# Patient Record
Sex: FEMALE | Race: WHITE | NOT HISPANIC OR LATINO | Employment: OTHER | RURAL
[De-identification: names, ages, dates, MRNs, and addresses within clinical notes are randomized per-mention and may not be internally consistent; named-entity substitution may affect disease eponyms.]

---

## 2023-04-26 ENCOUNTER — OFFICE VISIT (OUTPATIENT)
Dept: FAMILY MEDICINE CLINIC | Facility: CLINIC | Age: 68
End: 2023-04-26
Payer: MEDICARE

## 2023-04-26 VITALS
BODY MASS INDEX: 25.39 KG/M2 | HEART RATE: 99 BPM | WEIGHT: 148.7 LBS | DIASTOLIC BLOOD PRESSURE: 86 MMHG | SYSTOLIC BLOOD PRESSURE: 208 MMHG | HEIGHT: 64 IN | OXYGEN SATURATION: 95 %

## 2023-04-26 DIAGNOSIS — Z12.11 SCREENING FOR COLON CANCER: ICD-10-CM

## 2023-04-26 DIAGNOSIS — F51.01 PRIMARY INSOMNIA: ICD-10-CM

## 2023-04-26 DIAGNOSIS — B02.9 HERPES ZOSTER WITHOUT COMPLICATION: ICD-10-CM

## 2023-04-26 DIAGNOSIS — Z13.820 SCREENING FOR OSTEOPOROSIS: ICD-10-CM

## 2023-04-26 DIAGNOSIS — I10 ESSENTIAL HYPERTENSION: Primary | ICD-10-CM

## 2023-04-26 DIAGNOSIS — Z78.0 POSTMENOPAUSAL: ICD-10-CM

## 2023-04-26 DIAGNOSIS — Z12.31 ENCOUNTER FOR SCREENING MAMMOGRAM FOR MALIGNANT NEOPLASM OF BREAST: ICD-10-CM

## 2023-04-26 DIAGNOSIS — Z87.891 PERSONAL HISTORY OF TOBACCO USE: ICD-10-CM

## 2023-04-26 PROCEDURE — 3079F DIAST BP 80-89 MM HG: CPT | Performed by: INTERNAL MEDICINE

## 2023-04-26 PROCEDURE — 99204 OFFICE O/P NEW MOD 45 MIN: CPT | Performed by: INTERNAL MEDICINE

## 2023-04-26 PROCEDURE — 3077F SYST BP >= 140 MM HG: CPT | Performed by: INTERNAL MEDICINE

## 2023-04-26 PROCEDURE — 1160F RVW MEDS BY RX/DR IN RCRD: CPT | Performed by: INTERNAL MEDICINE

## 2023-04-26 PROCEDURE — 1159F MED LIST DOCD IN RCRD: CPT | Performed by: INTERNAL MEDICINE

## 2023-04-26 RX ORDER — TRAZODONE HYDROCHLORIDE 150 MG/1
150 TABLET ORAL NIGHTLY
COMMUNITY
End: 2023-04-26 | Stop reason: SDUPTHER

## 2023-04-26 RX ORDER — GABAPENTIN 800 MG/1
800 TABLET ORAL 4 TIMES DAILY
COMMUNITY

## 2023-04-26 RX ORDER — HYDROCODONE BITARTRATE AND ACETAMINOPHEN 10; 325 MG/1; MG/1
1 TABLET ORAL EVERY 6 HOURS PRN
COMMUNITY

## 2023-04-26 RX ORDER — CHOLECALCIFEROL (VITAMIN D3) 125 MCG
5 CAPSULE ORAL
COMMUNITY

## 2023-04-26 RX ORDER — LISINOPRIL 10 MG/1
10 TABLET ORAL DAILY
Qty: 90 TABLET | Refills: 1 | Status: SHIPPED | OUTPATIENT
Start: 2023-04-26

## 2023-04-26 RX ORDER — METOPROLOL TARTRATE 50 MG/1
50 TABLET, FILM COATED ORAL 2 TIMES DAILY
Qty: 180 TABLET | Refills: 1 | Status: SHIPPED | OUTPATIENT
Start: 2023-04-26

## 2023-04-26 RX ORDER — TRAZODONE HYDROCHLORIDE 150 MG/1
150 TABLET ORAL NIGHTLY
Qty: 90 TABLET | Refills: 1 | Status: SHIPPED | OUTPATIENT
Start: 2023-04-26

## 2023-04-26 RX ORDER — LISINOPRIL 10 MG/1
10 TABLET ORAL DAILY
COMMUNITY
End: 2023-04-26 | Stop reason: SDUPTHER

## 2023-04-26 RX ORDER — VALACYCLOVIR HYDROCHLORIDE 1 G/1
1000 TABLET, FILM COATED ORAL 2 TIMES DAILY
Qty: 14 TABLET | Refills: 0 | Status: SHIPPED | OUTPATIENT
Start: 2023-04-26 | End: 2023-05-03

## 2023-04-26 RX ORDER — METOPROLOL TARTRATE 50 MG/1
50 TABLET, FILM COATED ORAL 2 TIMES DAILY
COMMUNITY
End: 2023-04-26 | Stop reason: SDUPTHER

## 2023-04-26 NOTE — PROGRESS NOTES
Office Note     Name: Chika Pike    : 1955     MRN: [id number]     Chief Complaint  Hand Pain (C/o left pointer finger pain. ), Back Pain (Pt complains of back pain that goes into her neck. ), and Establish Care (Pt would like to establish care today. )    Subjective     History of Present Illness:  Chika Pike is a 68 y.o. female who presents today for establishing care.        Previous Doctor: Caroline  Other Doctors: Dr [facility]; has branden seeing them for hte last 7 years due to DDD, thinks she had complications from a spinal fusion    Last colonoscopy: Age 52   Location:[facility]  Last Mammo: Last Year   Location: [facility]- has had 2 biopsies in the past.   Last PAP: Does not recall   Location: prior PCP  Last Dexa: Years ago    Location:  [place]  Last LDCT: None    Location:NONE  Last fasting labs: < 1 year   Location Prior PCP    HTN:  Has been on meds about 10 years, recently started lisinopril but has been out for about 1 week     recently passed away this past November, has been struggling with grief. He did have hospice services.  Has been much more down and depressed,  Has new onset panic attacks , gets shortness of breath  Episodes happening 4-5 tiems a per week.  Currently lives alone, daughter and son are in [place].    Has made herself more secluded     Has been on multiple antidepresseants in the distant past and nothing work.       Review of Systems:   Review of Systems    Past Medical History:   Past Medical History:   Diagnosis Date   • Ankle fracture     Right   • H/O spinal fusion    • Sinus symptom     Sinus Surgery       Past Surgical History:   Past Surgical History:   Procedure Laterality Date   • BREAST BIOPSY      Dr Keith Velazquez   • COLONOSCOPY     • SINUS SURGERY     • SPINAL FUSION         Family History:   Family History   Problem Relation Age of Onset   • Diabetes Brother    • Breast cancer Maternal Aunt   "      Social History:   Social History     Socioeconomic History   • Marital status:    Tobacco Use   • Smoking status: Every Day     Packs/day: 1.00     Years: 36.00     Pack years: 36.00     Types: Cigarettes     Start date: 1987   • Smokeless tobacco: Never   Vaping Use   • Vaping Use: Never used   Substance and Sexual Activity   • Alcohol use: Not Currently   • Drug use: Defer       Immunizations:   There is no immunization history on file for this patient.     Medications:     Current Outpatient Medications:   •  CBD (cannabidiol) oral oil, Take  by mouth., Disp: , Rfl:   •  ELDERBERRY PO, Take  by mouth., Disp: , Rfl:   •  gabapentin (NEURONTIN) 800 MG tablet, Take 1 tablet by mouth 4 (Four) Times a Day., Disp: , Rfl:   •  HYDROcodone-acetaminophen (NORCO)  MG per tablet, Take 1 tablet by mouth Every 6 (Six) Hours As Needed for Moderate Pain., Disp: , Rfl:   •  lisinopril (PRINIVIL,ZESTRIL) 10 MG tablet, Take 1 tablet by mouth Daily., Disp: , Rfl:   •  melatonin 5 MG tablet tablet, Take 1 tablet by mouth., Disp: , Rfl:   •  metoprolol tartrate (LOPRESSOR) 50 MG tablet, Take 1 tablet by mouth 2 (Two) Times a Day., Disp: , Rfl:   •  traZODone (DESYREL) 150 MG tablet, Take 1 tablet by mouth Every Night., Disp: , Rfl:     Allergies:   Allergies   Allergen Reactions   • Codeine Unknown - High Severity   • Morphine Unknown - High Severity       Objective     Vital Signs  BP (!) 208/86   Pulse 99   Ht 162.6 cm (64\")   Wt 67.4 kg (148 lb 11.2 oz)   SpO2 95%   BMI 25.52 kg/m²   Estimated body mass index is 25.52 kg/m² as calculated from the following:    Height as of this encounter: 162.6 cm (64\").    Weight as of this encounter: 67.4 kg (148 lb 11.2 oz).          Physical Exam  Vitals and nursing note reviewed.   Constitutional:       Appearance: Normal appearance.   Cardiovascular:      Rate and Rhythm: Normal rate and regular rhythm.      Heart sounds: No murmur heard.    No friction rub. No " gallop.   Pulmonary:      Effort: Pulmonary effort is normal.      Breath sounds: Normal breath sounds. No wheezing, rhonchi or rales.   Skin:     Findings: Lesion present. Bruising: right gluteal region, spares midlin,e cluster of papulovesicular lesions.   Neurological:      Mental Status: She is alert.         Procedures     Assessment and Plan     1. Essential hypertension  - lisinopril (PRINIVIL,ZESTRIL) 10 MG tablet; Take 1 tablet by mouth Daily.  Dispense: 90 tablet; Refill: 1  - metoprolol tartrate (LOPRESSOR) 50 MG tablet; Take 1 tablet by mouth 2 (Two) Times a Day.  Dispense: 180 tablet; Refill: 1    2. Primary insomnia  - traZODone (DESYREL) 150 MG tablet; Take 1 tablet by mouth Every Night.  Dispense: 90 tablet; Refill: 1    3. Encounter for screening mammogram for malignant neoplasm of breast  - Mammo Screening Bilateral With CAD; Future    4. Personal history of tobacco use  -  CT Chest Low Dose Cancer Screening WO; Future    5. Screening for osteoporosis  - DEXA Bone Density Axial; Future    6. Screening for colon cancer  - Ambulatory Referral For Screening Colonoscopy    7. Herpes zoster without complication  - valACYclovir (Valtrex) 1000 MG tablet; Take 1 tablet by mouth 2 (Two) Times a Day for 7 days.  Dispense: 14 tablet; Refill: 0    8. Postmenopausal  - DEXA Bone Density Axial; Future       Follow Up  Return in about 6 weeks (around 6/7/2023) for Followup with Dr Trevizo- IN PERSON.    Patient was given instructions and counseling regarding her condition or for health maintenance advice. Please see specific information pulled into the AVS if appropriate.     Carole Trevizo MD  [facility]  [address] [place] [id number] [phone]

## 2023-05-12 ENCOUNTER — TELEPHONE (OUTPATIENT)
Dept: FAMILY MEDICINE CLINIC | Facility: CLINIC | Age: 68
End: 2023-05-12
Payer: MEDICARE

## 2023-05-12 DIAGNOSIS — Z12.11 SCREENING FOR COLON CANCER: ICD-10-CM

## 2023-05-12 DIAGNOSIS — F33.1 MAJOR DEPRESSIVE DISORDER, RECURRENT EPISODE, MODERATE DEGREE: Primary | ICD-10-CM

## 2023-05-12 NOTE — TELEPHONE ENCOUNTER
"    Caller: Chika Pike \"Laura\"    Relationship: Self    Best call back number: [phone]    What medications are you currently taking:   Current Outpatient Medications on File Prior to Visit   Medication Sig Dispense Refill   • CBD (cannabidiol) oral oil Take  by mouth.     • ELDERBERRY PO Take  by mouth.     • gabapentin (NEURONTIN) 800 MG tablet Take 1 tablet by mouth 4 (Four) Times a Day.     • HYDROcodone-acetaminophen (NORCO)  MG per tablet Take 1 tablet by mouth Every 6 (Six) Hours As Needed for Moderate Pain.     • lisinopril (PRINIVIL,ZESTRIL) 10 MG tablet Take 1 tablet by mouth Daily. 90 tablet 1   • melatonin 5 MG tablet tablet Take 1 tablet by mouth.     • metoprolol tartrate (LOPRESSOR) 50 MG tablet Take 1 tablet by mouth 2 (Two) Times a Day. 180 tablet 1   • traZODone (DESYREL) 150 MG tablet Take 1 tablet by mouth Every Night. 90 tablet 1     No current facility-administered medications on file prior to visit.       What are your concerns: PT STATED THAT SHE HAS NOT RECEIVED HER DEPRESSION MEDICATION, STATED THAT IT HAS NOT BEE S[facility] TO [facility]ARMACY: [facility] [address] [facility] - [phone]  - [phone] FX      "

## 2023-05-12 NOTE — TELEPHONE ENCOUNTER
"  Caller: Chika Pike \"Laura\"     Relationship: [unfilled]     Best call back number: [phone]    What is your medical concern: PT STATED THAT COLONOSCOPY HAS BEEN ORDER FOR HER BUT SHE WOULD RATHER FOR THE COLOGAURD.  "

## 2023-05-17 RX ORDER — SERTRALINE HYDROCHLORIDE 25 MG/1
25 TABLET, FILM COATED ORAL DAILY
Qty: 90 TABLET | Refills: 1 | Status: SHIPPED | OUTPATIENT
Start: 2023-05-17

## 2023-05-17 NOTE — TELEPHONE ENCOUNTER
New rx Sent for zoloft. This will hopefully help with both anxiety and depression.  Her other meds were already sent to the pharmacy. She can take this at bedtime with trazodone. I know she took depression meds in the past but hopefulli since it's been awhile and her situation has changes she may have better luck with this now.  I have also ordered cologard for her.  Keep in mind cologard should not be used if she has had polyps in the past or if there is a family member with colon cancer. If the cologard is abnormal she willl still need to get the colonoscopy at a later date.

## 2023-05-30 DIAGNOSIS — N28.9 KIDNEY LESION: Primary | ICD-10-CM

## 2023-06-05 ENCOUNTER — TELEPHONE (OUTPATIENT)
Dept: FAMILY MEDICINE CLINIC | Facility: CLINIC | Age: 68
End: 2023-06-05
Payer: MEDICARE

## 2023-06-05 NOTE — TELEPHONE ENCOUNTER
"Caller: Chiak Pike \"Laura\"    Relationship: Self    Best call back number:     [phone]       Requested Prescriptions:   Requested Prescriptions      No prescriptions requested or ordered in this encounter    [facility]    Pharmacy where request should be sent: [place] [address] [facility] [address] - [phone] PH - [phone] FX     Last office visit with prescribing clinician: 4/26/2023   Last telemedicine visit with prescribing clinician: Visit date not found   Next office visit with prescribing clinician: 7/25/2023     Additional details provided by patient: PATIENT IS OUT OF THE MEDICATION AND SICK TO HER STOMACH     Does the patient have less than a 3 day supply:  [x] Yes  [] No    Would you like a call back once the refill request has been completed: [] Yes [x] No    If the office needs to give you a call back, can they leave a voicemail: [] Yes [x] No      "

## 2023-06-06 NOTE — TELEPHONE ENCOUNTER
Not on med list, called pt, said old PCP prescribed it last but she sees Dr. Trevizo now. Has appt scheduled 07/25/23. Told her I would ask if Dr. Trevizo could go ahead and refill

## 2023-06-09 NOTE — TELEPHONE ENCOUNTER
She was supposed to followup this week regarding her very poorly controlled blood pressure but it looks like she cancelled/rescheduled it.  We can discuss it when she comes back for her followup but I do not feel comfortable prescribing this medication since it was not discussed.

## 2023-06-13 NOTE — TELEPHONE ENCOUNTER
Called and gave the message, she says she is feeling well now. She was having a lot of double vision and feeling funny, she went to the eye doctor and they said she has bad cataracts.

## 2023-07-18 PROBLEM — J44.9 COPD (CHRONIC OBSTRUCTIVE PULMONARY DISEASE): Status: ACTIVE | Noted: 2023-07-18

## 2023-08-01 ENCOUNTER — TELEPHONE (OUTPATIENT)
Dept: FAMILY MEDICINE CLINIC | Facility: CLINIC | Age: 68
End: 2023-08-01
Payer: MEDICARE

## 2023-08-01 NOTE — TELEPHONE ENCOUNTER
PT CALLED TO REPORT SHE DID NOT KNOW SHE NEEDED REFERRAL FOR LUNG DOCTOR. SHE IS REQUESTING A REFERRAL BE SENT IN TO PULMONOLOGY IN [place]. PLEASE CALL TO ADVISE.

## 2023-08-16 NOTE — TELEPHONE ENCOUNTER
To do a referral I need  the reasons for medical necessity and diagnosis codes  documented in the chart more specifically, lets have her keep her appointment later this month and I would be happy to place the referral at that time with more detailed information and do any other testing that is needed as well.

## 2023-08-30 ENCOUNTER — OFFICE VISIT (OUTPATIENT)
Dept: FAMILY MEDICINE CLINIC | Facility: CLINIC | Age: 68
End: 2023-08-30
Payer: OTHER GOVERNMENT

## 2023-08-30 VITALS
SYSTOLIC BLOOD PRESSURE: 164 MMHG | DIASTOLIC BLOOD PRESSURE: 82 MMHG | HEART RATE: 78 BPM | BODY MASS INDEX: 21.5 KG/M2 | HEIGHT: 67 IN | OXYGEN SATURATION: 98 % | WEIGHT: 137 LBS

## 2023-08-30 DIAGNOSIS — I10 ESSENTIAL HYPERTENSION: ICD-10-CM

## 2023-08-30 DIAGNOSIS — J44.9 CHRONIC OBSTRUCTIVE PULMONARY DISEASE, UNSPECIFIED COPD TYPE: Primary | ICD-10-CM

## 2023-08-30 DIAGNOSIS — F33.1 MAJOR DEPRESSIVE DISORDER, RECURRENT EPISODE, MODERATE DEGREE: ICD-10-CM

## 2023-08-30 PROCEDURE — 99213 OFFICE O/P EST LOW 20 MIN: CPT | Performed by: INTERNAL MEDICINE

## 2023-08-30 RX ORDER — FLUTICASONE FUROATE AND VILANTEROL 100; 25 UG/1; UG/1
1 POWDER RESPIRATORY (INHALATION)
Qty: 3 EACH | Refills: 1 | Status: SHIPPED | OUTPATIENT
Start: 2023-08-30

## 2023-08-30 RX ORDER — LISINOPRIL 20 MG/1
20 TABLET ORAL DAILY
Qty: 90 TABLET | Refills: 1 | Status: SHIPPED | OUTPATIENT
Start: 2023-08-30 | End: 2023-09-12 | Stop reason: SDUPTHER

## 2023-08-30 NOTE — PROGRESS NOTES
Office Note     Name: Chika Pike    : 1955     MRN: [id number]     Chief Complaint  Hypertension (Pt is here for a medication recheck today on HTN. )    Answers submitted by the patient for this visit:  Primary Reason for Visit (Submitted on 2023)  What is the primary reason for your visit?: High Blood Pressure    Subjective         History of Present Illness:    Chika Pike is a 68 y.o. female [facility]      PUL Referral has history of COPD, takes medications but would like to follow [facility]    Hypertension: Patient is here to followup on hypertension and is  taking all medications. Patient has not  experienced signicant side effects.  Is currently asymptomatic  in terms of chest pain, palpitations, shortness of breath.     Post office has not been able to  or take her sampel because the label wouldn't scan properly      Goes back to Cardiology in [place] in October.    COPD;  has been on Breo but has run out of her medication      Review of Systems:   Review of Systems    Past Medical History:   Past Medical History:   Diagnosis Date    Ankle fracture     Right    H/O spinal fusion     Sinus symptom     Sinus Surgery       Past Surgical History:   Past Surgical History:   Procedure Laterality Date    BREAST BIOPSY      Dr Keith Geller [place]    COLONOSCOPY      SINUS SURGERY      SPINAL FUSION         Family History:   Family History   Problem Relation Age of Onset    Diabetes Brother     Breast cancer Maternal Aunt        Social History:   Social History     Socioeconomic History    Marital status:    Tobacco Use    Smoking status: Every Day     Packs/day: 1.00     Years: 36.00     Pack years: 36.00     Types: Cigarettes     Start date:     Smokeless tobacco: Never   Vaping Use    Vaping Use: Never used   Substance and Sexual Activity    Alcohol use: Not Currently    Drug use: Defer       Immunizations:   Immunization History   Administered  "Date(s) Administered    COVID-19 (PFIZER) Purple Cap Monovalent 08/30/2021, 09/22/2021    Hepatitis A 02/19/2019, 08/26/2019        Medications:     Current Outpatient Medications:     amitriptyline (ELAVIL) 25 MG tablet, Take 1 tablet by mouth Every Night., Disp: 30 tablet, Rfl: 3    CBD (cannabidiol) oral oil, Take  by mouth., Disp: , Rfl:     ELDERBERRY PO, Take  by mouth., Disp: , Rfl:     lisinopril (PRINIVIL,ZESTRIL) 10 MG tablet, Take 1 tablet by mouth Daily., Disp: 90 tablet, Rfl: 0    melatonin 5 MG tablet tablet, Take 1 tablet by mouth., Disp: , Rfl:     metoprolol tartrate (LOPRESSOR) 50 MG tablet, Take 1 tablet by mouth 2 (Two) Times a Day., Disp: 180 tablet, Rfl: 1    sertraline (Zoloft) 50 MG tablet, Take 0.5 tablets by mouth Daily., Disp: 30 tablet, Rfl: 1    traZODone (DESYREL) 150 MG tablet, Take 1 tablet by mouth Every Night., Disp: 90 tablet, Rfl: 1    Allergies:   Allergies   Allergen Reactions    Codeine Rash    Morphine Rash       Objective     Vital Signs  /82   Pulse 78   Ht 170.2 cm (67\")   Wt 62.1 kg (137 lb)   SpO2 98%   BMI 21.46 kg/m²   Estimated body mass index is 21.46 kg/m² as calculated from the following:    Height as of this encounter: 170.2 cm (67\").    Weight as of this encounter: 62.1 kg (137 lb).    BMI is within normal parameters. No other follow-up for BMI required.      Physical Exam  Vitals and nursing note reviewed.   Constitutional:       Appearance: Normal appearance.   Cardiovascular:      Rate and Rhythm: Normal rate and regular rhythm.      Heart sounds: No murmur heard.    No friction rub. No gallop.   Pulmonary:      Effort: Pulmonary effort is normal.      Breath sounds: Normal breath sounds. No wheezing, rhonchi or rales.   Neurological:      Mental Status: She is alert.          Procedures     Assessment and Plan      1. Chronic obstructive pulmonary disease, unspecified COPD type    - Fluticasone Furoate-Vilanterol 100-25 MCG/ACT aerosol powder ; " Inhale 1 puff Daily.  Dispense: 3 each; Refill: 1  - Ambulatory Referral to Pulmonology    2. Essential hypertension  - continue lisinipril  - metoprolol tartrate (LOPRESSOR) 50 MG tablet; Take 1 tablet by mouth 2 (Two) Times a Day.  Dispense: 180 tablet; Refill: 1    3. Major depressive disorder, recurrent episode, moderate degree    - sertraline (Zoloft) 50 MG tablet; Take 0.5 tablets by mouth Daily.  Dispense: 30 tablet; Refill: 1        Follow Up  Return in about 4 months (around 12/30/2023) for Followup with Dr Trevizo- IN PERSON.    Patient was advised to call the office or seek medical care if  any issues discussed during this visit worsen or persist or if new concerns arise        [facility]  [address] [place] [id number] [phone]

## 2023-09-12 DIAGNOSIS — I10 ESSENTIAL HYPERTENSION: ICD-10-CM

## 2023-09-12 NOTE — TELEPHONE ENCOUNTER
"  Caller: Chika Pike \"Laura\"    Relationship: Self    Best call back number: [phone]     Requested Prescriptions:   Requested Prescriptions     Pending Prescriptions Disp Refills    lisinopril (PRINIVIL,ZESTRIL) 20 MG tablet 90 tablet 1     Sig: Take 1 tablet by mouth Daily.        Pharmacy where request should be sent: [place] [address] - [phone] PH - [phone] FX     Last office visit with prescribing clinician: 8/30/2023   Last telemedicine visit with prescribing clinician: Visit date not found   Next office visit with prescribing clinician: 12/26/2023     Additional details provided by patient: PATIENT CALLED STATING THAT SHE IS OUT OF MEDICATION.  PATIENT STATED THAT SHE IS WAITING FOR HER MEDICATION TO BE SENT FROM HER MAIL PHARMACY.  SHE STATED IT WILL BE ANOTHER  5 DAYS.     Does the patient have less than a 3 day supply:  [x] Yes  [] No    Would you like a call back once the refill request has been completed: [x] Yes [] No    If the office needs to give you a call back, can they leave a voicemail: [x] Yes [] No    Maverick Mckenzie Rep   09/12/23 13:36 EDT     "

## 2023-09-13 RX ORDER — LISINOPRIL 20 MG/1
20 TABLET ORAL DAILY
Qty: 30 TABLET | Refills: 0 | Status: SHIPPED | OUTPATIENT
Start: 2023-09-13

## 2023-09-29 ENCOUNTER — TELEPHONE (OUTPATIENT)
Dept: FAMILY MEDICINE CLINIC | Facility: CLINIC | Age: 68
End: 2023-09-29
Payer: MEDICARE

## 2023-09-29 RX ORDER — METOPROLOL TARTRATE 50 MG/1
50 TABLET, FILM COATED ORAL 2 TIMES DAILY
Qty: 180 TABLET | Refills: 1 | Status: SHIPPED | OUTPATIENT
Start: 2023-09-29

## 2023-09-29 NOTE — TELEPHONE ENCOUNTER
"   Caller: Chika Pike \"Laura\"     Relationship: Self     Best call back number: [phone]      What medication are you requesting: LINZESS 72MG  AND NEEDS IT FOR 3 MONTH SUPPLY      If a prescription is needed, what is your preferred pharmacy and phone number:  [facility] [place] [id number] [facility] - [phone] [facility] [phone]       Additional notes:  GIVEN [facility] BUT SHE NO LONGER GOES THERE     "

## 2023-09-29 NOTE — TELEPHONE ENCOUNTER
"  Caller: Chika Pike \"Laura\"    Relationship: Self    Best call back number: [phone]     What medication are you requesting: LINZESS     If a prescription is needed, what is your preferred pharmacy and phone number:  [facility] [place] [address] [facility] - [phone] [facility] [phone]      Additional notes:  GIVEN TO [facility] BUT SHE NO LONGER GOES THERE.    "

## 2023-10-02 ENCOUNTER — TELEPHONE (OUTPATIENT)
Dept: FAMILY MEDICINE CLINIC | Facility: CLINIC | Age: 68
End: 2023-10-02
Payer: MEDICARE

## 2023-10-02 NOTE — TELEPHONE ENCOUNTER
Incoming Refill Request      Medication requested (name and dose):   LINZESS - PT DID NOT KNOW DOSAGE  TRAZODONE 150 MG  TIZANIDINE - PT DID NOT KNOW DOSAGE      Pharmacy where request should be sent:   [facility]    Additional details provided by patient:   PATIENT REQUESTS A 90 DAY SUPPLY    Best call back number: [phone]     Does the patient have less than a 3 day supply:  [x] Yes  [] No    Maverick Ac Rep  10/02/23, 17:06 EDT

## 2023-10-04 DIAGNOSIS — F51.01 PRIMARY INSOMNIA: ICD-10-CM

## 2023-10-05 RX ORDER — TRAZODONE HYDROCHLORIDE 150 MG/1
150 TABLET ORAL NIGHTLY
Qty: 90 TABLET | Refills: 0 | Status: SHIPPED | OUTPATIENT
Start: 2023-10-05

## 2023-10-05 NOTE — TELEPHONE ENCOUNTER
Pt contacted the other two have been sent but I need the zanaflex dose and how she takes told pt  to call back up here with that info.

## 2023-10-05 NOTE — TELEPHONE ENCOUNTER
Per note on 7/18, Dr Trevizo is using Elavil to help with neuopathic symptoms since patient was taken off of gabapentin. It was discussed at that visit about risks involved. Will send in 1 fill, further refills to come from Dr. Trevizo. Thanks.

## 2023-10-16 ENCOUNTER — TELEPHONE (OUTPATIENT)
Dept: FAMILY MEDICINE CLINIC | Facility: CLINIC | Age: 68
End: 2023-10-16

## 2023-10-16 DIAGNOSIS — I10 ESSENTIAL HYPERTENSION: ICD-10-CM

## 2023-10-16 RX ORDER — LISINOPRIL 20 MG/1
20 TABLET ORAL DAILY
Qty: 90 TABLET | Refills: 0 | Status: SHIPPED | OUTPATIENT
Start: 2023-10-16 | End: 2023-12-26 | Stop reason: SDUPTHER

## 2023-10-18 ENCOUNTER — TELEPHONE (OUTPATIENT)
Dept: FAMILY MEDICINE CLINIC | Facility: CLINIC | Age: 68
End: 2023-10-18
Payer: MEDICARE

## 2023-10-18 NOTE — TELEPHONE ENCOUNTER
"Caller: Chika Pike \"Laura\"    Relationship: Self    Best call back number: [phone]    What medication are you requesting:     PHENEGRAN OR ZOFRAN    What are your current symptoms:     VOMITING    If a prescription is needed, what is your preferred pharmacy and phone number:      [facility] - [place] [address] [address] - [phone]  - [phone] FX      Additional notes:    PEPTO IS NOT HELPING    PLEASE CALL PATIENT WHEN THIS HAS BEEN CALLED IN   "

## 2023-10-24 RX ORDER — ONDANSETRON HYDROCHLORIDE 8 MG/1
8 TABLET, FILM COATED ORAL EVERY 8 HOURS PRN
Qty: 12 TABLET | Refills: 1 | Status: SHIPPED | OUTPATIENT
Start: 2023-10-24

## 2023-10-25 ENCOUNTER — TELEPHONE (OUTPATIENT)
Dept: FAMILY MEDICINE CLINIC | Facility: CLINIC | Age: 68
End: 2023-10-25

## 2023-10-25 DIAGNOSIS — F51.01 PRIMARY INSOMNIA: ICD-10-CM

## 2023-10-25 RX ORDER — AMITRIPTYLINE HYDROCHLORIDE 25 MG/1
25 TABLET, FILM COATED ORAL NIGHTLY
Qty: 90 TABLET | Refills: 0 | Status: SHIPPED | OUTPATIENT
Start: 2023-10-25 | End: 2023-12-26 | Stop reason: SDUPTHER

## 2023-12-13 ENCOUNTER — TELEPHONE (OUTPATIENT)
Dept: FAMILY MEDICINE CLINIC | Facility: CLINIC | Age: 68
End: 2023-12-13

## 2023-12-13 DIAGNOSIS — F51.01 PRIMARY INSOMNIA: Primary | ICD-10-CM

## 2023-12-13 DIAGNOSIS — F33.1 MAJOR DEPRESSIVE DISORDER, RECURRENT EPISODE, MODERATE DEGREE: ICD-10-CM

## 2023-12-13 NOTE — TELEPHONE ENCOUNTER
"Caller: Chika Pike \"Laura\"    Relationship: Self    Best call back number: [phone]     What is the medical concern/diagnosis: DEPRESSION AND ANXIETY, INSOMNIA     What specialty or service is being requested: PSYCHIATRIST     What is the provider, practice or medical service name: DR DAISY ERIC    What is the office location: [address] [place] [address]    What is the office phone number: [phone]    Any additional details: PATIENT CALLED REQUESTING A REFERRAL. PLEASE ADVISE IF ONE IS REQUIRED         "

## 2023-12-26 ENCOUNTER — OFFICE VISIT (OUTPATIENT)
Dept: FAMILY MEDICINE CLINIC | Facility: CLINIC | Age: 68
End: 2023-12-26
Payer: MEDICARE

## 2023-12-26 VITALS
SYSTOLIC BLOOD PRESSURE: 148 MMHG | WEIGHT: 146.9 LBS | HEIGHT: 66 IN | OXYGEN SATURATION: 97 % | HEART RATE: 83 BPM | BODY MASS INDEX: 23.61 KG/M2 | DIASTOLIC BLOOD PRESSURE: 72 MMHG

## 2023-12-26 DIAGNOSIS — J44.9 CHRONIC OBSTRUCTIVE PULMONARY DISEASE, UNSPECIFIED COPD TYPE: ICD-10-CM

## 2023-12-26 DIAGNOSIS — F33.1 MAJOR DEPRESSIVE DISORDER, RECURRENT EPISODE, MODERATE DEGREE: ICD-10-CM

## 2023-12-26 DIAGNOSIS — G89.29 CHRONIC LEFT-SIDED LOW BACK PAIN WITH LEFT-SIDED SCIATICA: ICD-10-CM

## 2023-12-26 DIAGNOSIS — F51.01 PRIMARY INSOMNIA: Primary | ICD-10-CM

## 2023-12-26 DIAGNOSIS — M51.37 DDD (DEGENERATIVE DISC DISEASE), LUMBOSACRAL: ICD-10-CM

## 2023-12-26 DIAGNOSIS — I10 ESSENTIAL HYPERTENSION: ICD-10-CM

## 2023-12-26 DIAGNOSIS — M79.2 NEUROPATHIC PAIN: ICD-10-CM

## 2023-12-26 DIAGNOSIS — M54.42 CHRONIC LEFT-SIDED LOW BACK PAIN WITH LEFT-SIDED SCIATICA: ICD-10-CM

## 2023-12-26 PROCEDURE — 3077F SYST BP >= 140 MM HG: CPT | Performed by: INTERNAL MEDICINE

## 2023-12-26 PROCEDURE — 1159F MED LIST DOCD IN RCRD: CPT | Performed by: INTERNAL MEDICINE

## 2023-12-26 PROCEDURE — 1160F RVW MEDS BY RX/DR IN RCRD: CPT | Performed by: INTERNAL MEDICINE

## 2023-12-26 PROCEDURE — 3078F DIAST BP <80 MM HG: CPT | Performed by: INTERNAL MEDICINE

## 2023-12-26 PROCEDURE — 99213 OFFICE O/P EST LOW 20 MIN: CPT | Performed by: INTERNAL MEDICINE

## 2023-12-26 RX ORDER — FLUTICASONE FUROATE AND VILANTEROL 100; 25 UG/1; UG/1
1 POWDER RESPIRATORY (INHALATION)
Qty: 3 EACH | Refills: 1 | Status: SHIPPED | OUTPATIENT
Start: 2023-12-26

## 2023-12-26 RX ORDER — LISINOPRIL 20 MG/1
20 TABLET ORAL DAILY
Qty: 90 TABLET | Refills: 1 | Status: SHIPPED | OUTPATIENT
Start: 2023-12-26

## 2023-12-26 RX ORDER — GABAPENTIN 300 MG/1
300 CAPSULE ORAL 3 TIMES DAILY
COMMUNITY

## 2023-12-26 RX ORDER — HYDROCODONE BITARTRATE AND ACETAMINOPHEN 7.5; 325 MG/1; MG/1
1 TABLET ORAL 3 TIMES DAILY
COMMUNITY

## 2023-12-26 RX ORDER — METOPROLOL TARTRATE 50 MG/1
50 TABLET, FILM COATED ORAL 2 TIMES DAILY
Qty: 180 TABLET | Refills: 1 | Status: SHIPPED | OUTPATIENT
Start: 2023-12-26

## 2023-12-26 RX ORDER — AMITRIPTYLINE HYDROCHLORIDE 25 MG/1
25 TABLET, FILM COATED ORAL NIGHTLY
Qty: 90 TABLET | Refills: 1 | Status: SHIPPED | OUTPATIENT
Start: 2023-12-26

## 2023-12-26 RX ORDER — CYCLOBENZAPRINE HCL 10 MG
10 TABLET ORAL DAILY PRN
Qty: 10 TABLET | Refills: 0 | Status: SHIPPED | OUTPATIENT
Start: 2023-12-26

## 2023-12-26 RX ORDER — ALBUTEROL SULFATE 90 UG/1
2 AEROSOL, METERED RESPIRATORY (INHALATION)
COMMUNITY

## 2023-12-26 RX ORDER — TRAZODONE HYDROCHLORIDE 150 MG/1
150 TABLET ORAL NIGHTLY
Qty: 90 TABLET | Refills: 1 | Status: SHIPPED | OUTPATIENT
Start: 2023-12-26

## 2023-12-26 NOTE — PROGRESS NOTES
Office Note     Name: Chika Pike    : 1955     MRN: [id number]     Chief Complaint  Back Pain (Pt is here for a recheck on back pain today. )  Hypertension,  COPD    Answers submitted by the patient for this visit:  Primary Reason for Visit (Submitted on 2023)  What is the primary reason for your visit?: Back Pain  Back Pain Questionnaire (Submitted on 2023)  Chief Complaint: Back pain  Chronicity: chronic  Onset: more than 1 year ago  Frequency: constantly  Progression since onset: gradually worsening  Pain location: lumbar spine  Pain quality: aching  Radiates to: right foot, right knee, right thigh  Pain - numeric: 9/10  Pain is: the same all the time  Aggravated by: bending, coughing, position, lying down, sitting, standing, stress, twisting  Stiffness is present: all day  bowel incontinence: No  headaches: No  leg pain: Yes  paresis: No  paresthesias: Yes  perianal numbness: No  tingling: Yes    Subjective     History of Present Illness:  Chika Pike is a 68 y.o. female who presents today for:    Hypertension: Patient is here to followup on hypertension and is  taking all medications. Patient has not  experienced signicant side effects.  Is currently asymptomatic  in terms of chest pain, palpitations, shortness of breath.     Depression/Anxiety: Patient here to follow-up on chronic mood disorder.  Is  taking medications as directed without significant side effects.  Patient denies suicidal/homicidal ideation.     Also was recently seen in ER for sciatica flareup, they gave a steroid shot plus pred vikash but has not started the pred vikash yet.  No weakness    Review of Systems:   Review of Systems    Past Medical History:   Past Medical History:   Diagnosis Date    Ankle fracture     Right    H/O spinal fusion     Sinus symptom     Sinus Surgery       Past Surgical History:   Past Surgical History:   Procedure Laterality Date    BREAST BIOPSY      Dr Keith Velazquez     COLONOSCOPY      SINUS SURGERY      SPINAL FUSION         Family History:   Family History   Problem Relation Age of Onset    Diabetes Brother     Breast cancer Maternal Aunt        Social History:   Social History     Socioeconomic History    Marital status:    Tobacco Use    Smoking status: Every Day     Packs/day: 1.00     Years: 36.00     Additional pack years: 0.00     Total pack years: 36.00     Types: Cigarettes     Start date: 1987    Smokeless tobacco: Never   Vaping Use    Vaping Use: Never used   Substance and Sexual Activity    Alcohol use: Not Currently    Drug use: Defer       Immunizations:   Immunization History   Administered Date(s) Administered    COVID-19 (PFIZER) Purple Cap Monovalent 08/30/2021, 09/22/2021    Hepatitis A 02/19/2019, 08/26/2019        Medications:     Current Outpatient Medications:     albuterol sulfate  (90 Base) MCG/ACT inhaler, Inhale 2 puffs 4 (Four) Times a Day., Disp: , Rfl:     amitriptyline (ELAVIL) 25 MG tablet, Take 1 tablet by mouth Every Night., Disp: 90 tablet, Rfl: 1    CBD (cannabidiol) oral oil, Take  by mouth., Disp: , Rfl:     ELDERBERRY PO, Take  by mouth., Disp: , Rfl:     Fluticasone Furoate-Vilanterol 100-25 MCG/ACT aerosol powder , Inhale 1 puff Daily., Disp: 3 each, Rfl: 1    gabapentin (NEURONTIN) 300 MG capsule, Take 1 capsule by mouth 3 (Three) Times a Day., Disp: , Rfl:     HYDROcodone-acetaminophen (NORCO) 7.5-325 MG per tablet, Take 1 tablet by mouth 3 (Three) Times a Day., Disp: , Rfl:     linaclotide (Linzess) 72 MCG capsule capsule, Take 1 capsule by mouth Every Morning Before Breakfast., Disp: 90 capsule, Rfl: 1    lisinopril (PRINIVIL,ZESTRIL) 20 MG tablet, Take 1 tablet by mouth Daily., Disp: 90 tablet, Rfl: 1    melatonin 5 MG tablet tablet, Take 1 tablet by mouth., Disp: , Rfl:     metoprolol tartrate (LOPRESSOR) 50 MG tablet, Take 1 tablet by mouth 2 (Two) Times a Day., Disp: 180 tablet, Rfl: 1    ondansetron (Zofran) 8 MG  "tablet, Take 1 tablet by mouth Every 8 (Eight) Hours As Needed for Nausea or Vomiting., Disp: 12 tablet, Rfl: 1    sertraline (Zoloft) 50 MG tablet, Take 0.5 tablets by mouth Daily., Disp: 30 tablet, Rfl: 1    traZODone (DESYREL) 150 MG tablet, Take 1 tablet by mouth Every Night., Disp: 90 tablet, Rfl: 1      Allergies:   Allergies   Allergen Reactions    Codeine Rash    Morphine Rash       Objective     Vital Signs  /72   Pulse 83   Ht 167.6 cm (66\")   Wt 66.6 kg (146 lb 14.4 oz)   SpO2 97%   BMI 23.71 kg/m²   Estimated body mass index is 23.71 kg/m² as calculated from the following:    Height as of this encounter: 167.6 cm (66\").    Weight as of this encounter: 66.6 kg (146 lb 14.4 oz).    BMI is within normal parameters. No other follow-up for BMI required.      Physical Exam  Vitals and nursing note reviewed.   Constitutional:       Appearance: Normal appearance.   Cardiovascular:      Rate and Rhythm: Normal rate.      Heart sounds: Normal heart sounds.   Pulmonary:      Effort: Pulmonary effort is normal.      Breath sounds: Normal breath sounds.   Musculoskeletal:      Cervical back: No bony tenderness.      Thoracic back: No bony tenderness.      Lumbar back: No bony tenderness.      Comments: 5 / 5 strength bilateral lower extremities upon flexion and extension at hips knees and ankles.    Neurological:      Mental Status: She is alert.      Comments: Light touch sensation and sharp dull differentiation intact over bilateral lower extremities          Procedures     Assessment and Plan     1. Primary insomnia    - amitriptyline (ELAVIL) 25 MG tablet; Take 1 tablet by mouth Every Night.  Dispense: 90 tablet; Refill: 1  - traZODone (DESYREL) 150 MG tablet; Take 1 tablet by mouth Every Night.  Dispense: 90 tablet; Refill: 1    2. Essential hypertension    - lisinopril (PRINIVIL,ZESTRIL) 20 MG tablet; Take 1 tablet by mouth Daily.  Dispense: 90 tablet; Refill: 1  - metoprolol tartrate (LOPRESSOR) 50 " MG tablet; Take 1 tablet by mouth 2 (Two) Times a Day.  Dispense: 180 tablet; Refill: 1    3. Chronic obstructive pulmonary disease, unspecified COPD type    - Fluticasone Furoate-Vilanterol 100-25 MCG/ACT aerosol powder ; Inhale 1 puff Daily.  Dispense: 3 each; Refill: 1    4. Major depressive disorder, recurrent episode, moderate degree    - sertraline (Zoloft) 50 MG tablet; Take 0.5 tablets by mouth Daily.  Dispense: 30 tablet; Refill: 1    5. DDD (degenerative disc disease), lumbosacral      6. Neuropathic pain      7. Chronic left-sided low back pain with left-sided sciatica  Advised patient that Flexeril may cause sedation especially in light of her taking sedating medications from her pain doctor.  Recommended to use very sparingly notify her pain clinic that she been prescribed this medication so that it does not cause any failed UDS's.  Also recommended she start the prednisone pack she was prescribed from the ER.  - cyclobenzaprine (FLEXERIL) 10 MG tablet; Take 1 tablet by mouth Daily As Needed for Muscle Spasms.  Dispense: 10 tablet; Refill: 0       Follow Up  Return in about 6 months (around 6/26/2024) for Fasting, Annual physical.    Patient was advised to call the office or seek medical care if  any issues discussed during this visit worsen or persist or if new concerns arise        MD ALICIA Clinton [facility]  [address] [place] [id number] [phone]

## 2023-12-29 NOTE — TELEPHONE ENCOUNTER
Referral placed . Please use Dr Corky Pires [address] [place] [address] in [place]- but I could not find this specific doctor, I did copy and paste into the comment sections.

## 2024-01-11 ENCOUNTER — TELEPHONE (OUTPATIENT)
Dept: FAMILY MEDICINE CLINIC | Facility: CLINIC | Age: 69
End: 2024-01-11

## 2024-01-11 RX ORDER — TIZANIDINE HYDROCHLORIDE 2 MG/1
2 CAPSULE, GELATIN COATED ORAL EVERY 12 HOURS PRN
Qty: 12 CAPSULE | Refills: 0 | Status: SHIPPED | OUTPATIENT
Start: 2024-01-11 | End: 2024-01-17

## 2024-01-11 NOTE — TELEPHONE ENCOUNTER
"Caller: Chika Pike \"Laura\"    Relationship: Self    Best call back number: [phone]    What medication are you requesting: MUSCLE RELAXER    What are your current symptoms: PATIENT IS HAVING MUSCLE PAIN    Have you had these symptoms before:    [x] Yes  [] No    Have you been treated for these symptoms before:   [x] Yes  [] No    If a prescription is needed, what is your preferred pharmacy and phone number: [facility] - [place] [address] - [phone]  - [phone] FX     Additional notes: PATIENT STATED THAT SHE CANNOT TAKE FLEXIRIL          "

## 2024-01-16 ENCOUNTER — TELEPHONE (OUTPATIENT)
Dept: FAMILY MEDICINE CLINIC | Facility: CLINIC | Age: 69
End: 2024-01-16

## 2024-01-17 ENCOUNTER — OFFICE VISIT (OUTPATIENT)
Dept: FAMILY MEDICINE CLINIC | Facility: CLINIC | Age: 69
End: 2024-01-17
Payer: MEDICARE

## 2024-01-17 VITALS
OXYGEN SATURATION: 98 % | HEIGHT: 66 IN | BODY MASS INDEX: 23.78 KG/M2 | HEART RATE: 88 BPM | DIASTOLIC BLOOD PRESSURE: 84 MMHG | SYSTOLIC BLOOD PRESSURE: 122 MMHG | WEIGHT: 148 LBS

## 2024-01-17 DIAGNOSIS — M54.32 LEFT SCIATIC NERVE PAIN: Primary | ICD-10-CM

## 2024-01-17 DIAGNOSIS — I10 ESSENTIAL HYPERTENSION: ICD-10-CM

## 2024-01-17 DIAGNOSIS — Z98.1 H/O SPINAL FUSION: ICD-10-CM

## 2024-01-17 PROCEDURE — 1160F RVW MEDS BY RX/DR IN RCRD: CPT | Performed by: FAMILY MEDICINE

## 2024-01-17 PROCEDURE — 99213 OFFICE O/P EST LOW 20 MIN: CPT | Performed by: FAMILY MEDICINE

## 2024-01-17 PROCEDURE — 1159F MED LIST DOCD IN RCRD: CPT | Performed by: FAMILY MEDICINE

## 2024-01-17 PROCEDURE — 3074F SYST BP LT 130 MM HG: CPT | Performed by: FAMILY MEDICINE

## 2024-01-17 PROCEDURE — 3079F DIAST BP 80-89 MM HG: CPT | Performed by: FAMILY MEDICINE

## 2024-01-17 RX ORDER — TIZANIDINE 4 MG/1
4 TABLET ORAL NIGHTLY PRN
Qty: 60 TABLET | Refills: 1 | Status: SHIPPED | OUTPATIENT
Start: 2024-01-17

## 2024-01-18 DIAGNOSIS — J44.9 CHRONIC OBSTRUCTIVE PULMONARY DISEASE, UNSPECIFIED COPD TYPE: ICD-10-CM

## 2024-01-18 NOTE — TELEPHONE ENCOUNTER
"    Caller: FaridaChika holguin \"Laura\"    Relationship: Self    Best call back number: [phone]     Requested Prescriptions:   Requested Prescriptions     Pending Prescriptions Disp Refills    albuterol sulfate  (90 Base) MCG/ACT inhaler       Sig: Inhale 2 puffs 4 (Four) Times a Day.    Fluticasone Furoate-Vilanterol 100-25 MCG/ACT aerosol powder  3 each 1     Sig: Inhale 1 puff Daily.        Pharmacy where request should be sent:  [facility] [address] - [phone] [facility] [place] [facility] [phone]      Last office visit with prescribing clinician: 12/26/2023   Last telemedicine visit with prescribing clinician: Visit date not found   Next office visit with prescribing clinician: 6/28/2024     Additional details provided by patient: PATIENT WOULD LIKE TO KNOW IF ANOTHER MUSCLE RELAXER CAN BE CALLED IN AS THE CURRENT MEDICATION IS MAKING HER SICK, SYMPTOMS NAUSEA, VOMITING     Does the patient have less than a 3 day supply:  [x] Yes  [] No        Maverick Belcher Rep   01/18/24 13:54 EST         "

## 2024-01-19 RX ORDER — FLUTICASONE FUROATE AND VILANTEROL 100; 25 UG/1; UG/1
1 POWDER RESPIRATORY (INHALATION)
Qty: 3 EACH | Refills: 1 | Status: SHIPPED | OUTPATIENT
Start: 2024-01-19

## 2024-01-19 RX ORDER — ALBUTEROL SULFATE 90 UG/1
2 AEROSOL, METERED RESPIRATORY (INHALATION)
Qty: 18 G | Refills: 1 | Status: SHIPPED | OUTPATIENT
Start: 2024-01-19

## 2024-01-26 ENCOUNTER — OFFICE VISIT (OUTPATIENT)
Dept: FAMILY MEDICINE CLINIC | Facility: CLINIC | Age: 69
End: 2024-01-26
Payer: OTHER GOVERNMENT

## 2024-01-26 VITALS
DIASTOLIC BLOOD PRESSURE: 96 MMHG | SYSTOLIC BLOOD PRESSURE: 168 MMHG | HEIGHT: 66 IN | BODY MASS INDEX: 23.78 KG/M2 | OXYGEN SATURATION: 96 % | WEIGHT: 148 LBS | HEART RATE: 73 BPM

## 2024-01-26 DIAGNOSIS — G89.29 CHRONIC LEFT-SIDED LOW BACK PAIN WITH LEFT-SIDED SCIATICA: Primary | ICD-10-CM

## 2024-01-26 DIAGNOSIS — M54.42 CHRONIC LEFT-SIDED LOW BACK PAIN WITH LEFT-SIDED SCIATICA: Primary | ICD-10-CM

## 2024-01-26 RX ORDER — PREDNISONE 20 MG/1
TABLET ORAL
Qty: 18 TABLET | Refills: 0 | Status: SHIPPED | OUTPATIENT
Start: 2024-01-26

## 2024-01-26 RX ORDER — TIZANIDINE HYDROCHLORIDE 2 MG/1
4 CAPSULE, GELATIN COATED ORAL 3 TIMES DAILY PRN
Qty: 21 CAPSULE | Refills: 0 | Status: SHIPPED | OUTPATIENT
Start: 2024-01-26

## 2024-01-26 RX ORDER — TRIAMCINOLONE ACETONIDE 40 MG/ML
40 INJECTION, SUSPENSION INTRA-ARTICULAR; INTRAMUSCULAR ONCE
Status: COMPLETED | OUTPATIENT
Start: 2024-01-26 | End: 2024-01-26

## 2024-01-26 RX ADMIN — TRIAMCINOLONE ACETONIDE 40 MG: 40 INJECTION, SUSPENSION INTRA-ARTICULAR; INTRAMUSCULAR at 10:07

## 2024-01-26 NOTE — PROGRESS NOTES
Office Note     Name: Chika Pike    : 1955     MRN: [id number]     Chief Complaint  Back Pain (Pt has sciatic nerve pain)  Answers submitted by the patient for this visit:  Other (Submitted on 2024)  Please describe your symptoms.: Prescribed musclecrelaxer doesnt agree 'with  my stomach i have fought this sciatica fir yerss and this put me severely in pain  Have you had these symptoms before?: Yes  How long have you been having these symptoms?: Greater than 2 weeks  Please list any medications you are currently taking for this condition.: Xanzflex pain meds  Please describe any probable cause for these symptoms. : Have nonidea just happens  Primary Reason for Visit (Submitted on 2024)  What is the primary reason for your visit?: Other      Subjective     History of Present Illness:  Chika Pike is a 68 y.o. female who presents today for:    Has had worsening back pain and sciatica   Started  about a month ago, no trigger or injury.  Has tried flexeril which caused nausea and zanaflex which did not help    Hurts in the low back buttock and radiates down the left leg    Initially tried flexeril then tizanidine. Was seen at ER 2 weeks ago and given predpak which did not hlep.  Did not have imaging    Is also on subhash term gabapentin and norco from pain management but those doses have not changed      Review of Systems:   Review of Systems    Past Medical History:   Past Medical History:   Diagnosis Date    Ankle fracture     Right    H/O spinal fusion 2024    Sinus symptom     Sinus Surgery       Past Surgical History:   Past Surgical History:   Procedure Laterality Date    BREAST BIOPSY      Dr Keith Geller [place]    COLONOSCOPY      SINUS SURGERY      SPINAL FUSION         Family History:   Family History   Problem Relation Age of Onset    Diabetes Brother     Breast cancer Maternal Aunt        Social History:   Social History     Socioeconomic History    Marital status:     Tobacco Use    Smoking status: Every Day     Packs/day: 1.00     Years: 36.00     Additional pack years: 0.00     Total pack years: 36.00     Types: Cigarettes     Start date: 1987     Passive exposure: Current    Smokeless tobacco: Never   Vaping Use    Vaping Use: Never used   Substance and Sexual Activity    Alcohol use: Not Currently    Drug use: Defer       Immunizations:   Immunization History   Administered Date(s) Administered    COVID-19 (PFIZER) Purple Cap Monovalent 08/30/2021, 09/22/2021    Fluzone Quad >6mos (Multi-dose) 01/12/2024    Hepatitis A 02/19/2019, 08/26/2019        Medications:     Current Outpatient Medications:     albuterol sulfate  (90 Base) MCG/ACT inhaler, Inhale 2 puffs 4 (Four) Times a Day., Disp: 18 g, Rfl: 1    amitriptyline (ELAVIL) 25 MG tablet, Take 1 tablet by mouth Every Night., Disp: 90 tablet, Rfl: 1    CBD (cannabidiol) oral oil, Take  by mouth., Disp: , Rfl:     ELDERBERRY PO, Take  by mouth., Disp: , Rfl:     Fluticasone Furoate-Vilanterol 100-25 MCG/ACT aerosol powder , Inhale 1 puff Daily., Disp: 3 each, Rfl: 1    gabapentin (NEURONTIN) 300 MG capsule, Take 1 capsule by mouth 3 (Three) Times a Day., Disp: , Rfl:     HYDROcodone-acetaminophen (NORCO) 7.5-325 MG per tablet, Take 1 tablet by mouth 3 (Three) Times a Day., Disp: , Rfl:     linaclotide (Linzess) 72 MCG capsule capsule, Take 1 capsule by mouth Every Morning Before Breakfast., Disp: 90 capsule, Rfl: 1    lisinopril (PRINIVIL,ZESTRIL) 20 MG tablet, Take 1 tablet by mouth Daily., Disp: 90 tablet, Rfl: 1    melatonin 5 MG tablet tablet, Take 1 tablet by mouth., Disp: , Rfl:     metoprolol tartrate (LOPRESSOR) 50 MG tablet, Take 1 tablet by mouth 2 (Two) Times a Day., Disp: 180 tablet, Rfl: 1    ondansetron (Zofran) 8 MG tablet, Take 1 tablet by mouth Every 8 (Eight) Hours As Needed for Nausea or Vomiting., Disp: 12 tablet, Rfl: 1      sertraline (Zoloft) 50 MG tablet, Take 0.5 tablets by mouth Daily.,  "Disp: 30 tablet, Rfl: 1    TiZANidine (ZANAFLEX) 2 MG capsule, Take 2 capsules by mouth 3 (Three) Times a Day As Needed for Muscle Spasms., Disp: 21 capsule, Rfl: 0    traZODone (DESYREL) 150 MG tablet, Take 1 tablet by mouth Every Night., Disp: 90 tablet, Rfl: 1    Allergies:   Allergies   Allergen Reactions    Codeine Rash    Morphine Rash       Objective     Vital Signs  /96   Pulse 73   Ht 167.6 cm (66\")   Wt 67.1 kg (148 lb)   SpO2 96%   BMI 23.89 kg/m²   Estimated body mass index is 23.89 kg/m² as calculated from the following:    Height as of this encounter: 167.6 cm (66\").    Weight as of this encounter: 67.1 kg (148 lb).    BMI is within normal parameters. No other follow-up for BMI required.      Physical Exam  Vitals and nursing note reviewed.   Constitutional:       Appearance: Normal appearance.   Cardiovascular:      Rate and Rhythm: Normal rate and regular rhythm.      Heart sounds: Normal heart sounds. No murmur heard.     No friction rub. No gallop.   Pulmonary:      Effort: Pulmonary effort is normal.      Breath sounds: Normal breath sounds. No wheezing, rhonchi or rales.   Musculoskeletal:      Cervical back: No bony tenderness.      Thoracic back: No bony tenderness.      Lumbar back: No bony tenderness.      Comments: 5 / 5 strength bilateral lower extremities upon flexion and extension at hips knees and ankles.    Neurological:      Mental Status: She is alert.      Comments: Light touch sensation and sharp dull differentiation intact over bilateral lower extremities         Procedures     Assessment and Plan     1. Chronic left-sided low back pain with left-sided sciatica    - XR Spine Lumbar 2 or 3 View (In Office)  - triamcinolone acetonide (KENALOG-40) injection 40 mg  - predniSONE (DELTASONE) 20 MG tablet; Take 3 tablets x 3 days then 2 tablets x 3 days then 1 tablet x 3 days.  Dispense: 18 tablet; Refill: 0  - TiZANidine (ZANAFLEX) 2 MG capsule; Take 2 capsules by mouth 3 " (Three) Times a Day As Needed for Muscle Spasms.  Dispense: 21 capsule; Refill: 0       Follow Up  Return if symptoms worsen or fail to improve.    Patient was advised to call the office or seek medical care if  any issues discussed during this visit worsen or persist or if new concerns arise        MD ALICIA Clinton [facility]  [address] [facility] [address] [phone]

## 2024-02-07 DIAGNOSIS — J44.9 CHRONIC OBSTRUCTIVE PULMONARY DISEASE, UNSPECIFIED COPD TYPE: ICD-10-CM

## 2024-02-07 DIAGNOSIS — F33.1 MAJOR DEPRESSIVE DISORDER, RECURRENT EPISODE, MODERATE DEGREE: ICD-10-CM

## 2024-02-07 RX ORDER — ALBUTEROL SULFATE 90 UG/1
2 AEROSOL, METERED RESPIRATORY (INHALATION)
Qty: 18 G | Refills: 1 | Status: SHIPPED | OUTPATIENT
Start: 2024-02-07

## 2024-02-07 RX ORDER — FLUTICASONE FUROATE AND VILANTEROL 100; 25 UG/1; UG/1
1 POWDER RESPIRATORY (INHALATION)
Qty: 3 EACH | Refills: 1 | Status: SHIPPED | OUTPATIENT
Start: 2024-02-07

## 2024-02-07 NOTE — TELEPHONE ENCOUNTER
"Caller: Chika Pike \"Laura\"    Relationship: Self    Best call back number: [phone]    Requested Prescriptions:   Requested Prescriptions     Pending Prescriptions Disp Refills    sertraline (Zoloft) 50 MG tablet 30 tablet 1     Sig: Take 0.5 tablets by mouth Daily.    Fluticasone Furoate-Vilanterol 100-25 MCG/ACT aerosol powder  3 each 1     Sig: Inhale 1 puff Daily.    albuterol sulfate  (90 Base) MCG/ACT inhaler 18 g 1     Sig: Inhale 2 puffs 4 (Four) Times a Day.        Pharmacy where request should be sent:  [facility] [address] [phone] [facility] [phone]      Last office visit with prescribing clinician: 1/26/2024   Last telemedicine visit with prescribing clinician: Visit date not found   Next office visit with prescribing clinician: 6/28/2024     Additional details provided by patient: PATIENT WAS SEEN 01/26/2024 AND HER MEDICATIONS WAS SUPPOSED TO BE REFILLED BUT HER PHARMACY HAS NOT RECEIVED THE REQUEST AND NOW PATIENT IS OUT. SEND PRESCRIPTIONS FOR A 90 DAYS SUPPLY. SHE WAS TOLD TO HAVE THEM SENT STAT SO THEY CAN DELIVER SOON     Does the patient have less than a 3 day supply:  [x] Yes  [] No    Would you like a call back once the refill request has been completed: [x] Yes [] No    If the office needs to give you a call back, can they leave a voicemail: [x] Yes [] No    Maverick Gonzalez Rep   02/07/24 09:31 EST       "

## 2024-02-12 ENCOUNTER — TELEPHONE (OUTPATIENT)
Dept: FAMILY MEDICINE CLINIC | Facility: CLINIC | Age: 69
End: 2024-02-12
Payer: MEDICARE

## 2024-02-12 DIAGNOSIS — G89.29 CHRONIC LEFT-SIDED LOW BACK PAIN WITH LEFT-SIDED SCIATICA: ICD-10-CM

## 2024-02-12 DIAGNOSIS — S22.080S COMPRESSION FRACTURE OF T12 VERTEBRA, SEQUELA: Primary | ICD-10-CM

## 2024-02-12 DIAGNOSIS — M54.42 CHRONIC LEFT-SIDED LOW BACK PAIN WITH LEFT-SIDED SCIATICA: ICD-10-CM

## 2024-02-14 RX ORDER — TIZANIDINE HYDROCHLORIDE 4 MG/1
4 CAPSULE, GELATIN COATED ORAL 3 TIMES DAILY PRN
Qty: 21 CAPSULE | Refills: 0 | Status: SHIPPED | OUTPATIENT
Start: 2024-02-14

## 2024-02-15 ENCOUNTER — TELEPHONE (OUTPATIENT)
Dept: FAMILY MEDICINE CLINIC | Facility: CLINIC | Age: 69
End: 2024-02-15
Payer: MEDICARE

## 2024-02-22 ENCOUNTER — OFFICE VISIT (OUTPATIENT)
Dept: FAMILY MEDICINE CLINIC | Facility: CLINIC | Age: 69
End: 2024-02-22
Payer: MEDICARE

## 2024-02-22 VITALS
OXYGEN SATURATION: 96 % | BODY MASS INDEX: 23.54 KG/M2 | DIASTOLIC BLOOD PRESSURE: 68 MMHG | HEIGHT: 67 IN | HEART RATE: 70 BPM | WEIGHT: 150 LBS | SYSTOLIC BLOOD PRESSURE: 134 MMHG

## 2024-02-22 DIAGNOSIS — G89.29 CHRONIC LEFT-SIDED LOW BACK PAIN WITH LEFT-SIDED SCIATICA: ICD-10-CM

## 2024-02-22 DIAGNOSIS — Z12.31 ENCOUNTER FOR SCREENING MAMMOGRAM FOR MALIGNANT NEOPLASM OF BREAST: ICD-10-CM

## 2024-02-22 DIAGNOSIS — D64.9 ANEMIA, UNSPECIFIED TYPE: ICD-10-CM

## 2024-02-22 DIAGNOSIS — Z13.1 SCREENING FOR DIABETES MELLITUS: ICD-10-CM

## 2024-02-22 DIAGNOSIS — R53.83 OTHER FATIGUE: ICD-10-CM

## 2024-02-22 DIAGNOSIS — I10 ESSENTIAL HYPERTENSION: ICD-10-CM

## 2024-02-22 DIAGNOSIS — Z13.220 SCREENING FOR HYPERLIPIDEMIA: ICD-10-CM

## 2024-02-22 DIAGNOSIS — R11.0 NAUSEA: ICD-10-CM

## 2024-02-22 DIAGNOSIS — M54.42 CHRONIC LEFT-SIDED LOW BACK PAIN WITH LEFT-SIDED SCIATICA: ICD-10-CM

## 2024-02-22 DIAGNOSIS — S22.080S COMPRESSION FRACTURE OF T12 VERTEBRA, SEQUELA: Primary | ICD-10-CM

## 2024-02-22 NOTE — PROGRESS NOTES
Office Note     Name: Chika Pike    : 1955     MRN: [id number]     Chief Complaint  Back Pain (Pt complains of low back pain today. ) and Fatigue (Pt complains of fatigue. She would like lab work. )    Subjective     History of Present Illness:  Chika Pike is a 68 y.o. female who presents today for:    Worsening back pain. In January had CXR which showed Chronic appearing T12 compression fracture but did not recall a recent injury.  Follows with pain management and has had prior spinal fusion but at a lower level than the compression fracture. At the time she declined specialty referral or MRI but back pain has continued to worse,n is bilateral and radiates down  the legs      Also struggling with daytime fatigue, feels tired all the time.  No fevers or chills  Review of Systems:   Review of Systems    Past Medical History:   Past Medical History:   Diagnosis Date    Ankle fracture     Right    H/O spinal fusion 2024    Sinus symptom     Sinus Surgery       Past Surgical History:   Past Surgical History:   Procedure Laterality Date    BREAST BIOPSY      Dr Keith Geller [place]    COLONOSCOPY      SINUS SURGERY      SPINAL FUSION         Family History:   Family History   Problem Relation Age of Onset    Diabetes Brother     Breast cancer Maternal Aunt        Social History:   Social History     Socioeconomic History    Marital status:    Tobacco Use    Smoking status: Every Day     Current packs/day: 1.00     Average packs/day: 1 pack/day for 37.2 years (37.2 ttl pk-yrs)     Types: Cigarettes     Start date:      Passive exposure: Current    Smokeless tobacco: Never   Vaping Use    Vaping status: Never Used   Substance and Sexual Activity    Alcohol use: Not Currently    Drug use: Defer       Immunizations:   Immunization History   Administered Date(s) Administered    COVID-19 (PFIZER) Purple Cap Monovalent 2021, 2021    Fluzone Quad >6mos (Multi-dose) 2024     "Hepatitis A 02/19/2019, 08/26/2019        Medications:     Current Outpatient Medications:     albuterol sulfate  (90 Base) MCG/ACT inhaler, Inhale 2 puffs 4 (Four) Times a Day., Disp: 18 g, Rfl: 1    amitriptyline (ELAVIL) 25 MG tablet, Take 1 tablet by mouth Every Night., Disp: 90 tablet, Rfl: 1    CBD (cannabidiol) oral oil, Take  by mouth., Disp: , Rfl:     ELDERBERRY PO, Take  by mouth., Disp: , Rfl:     Fluticasone Furoate-Vilanterol 100-25 MCG/ACT aerosol powder , Inhale 1 puff Daily., Disp: 3 each, Rfl: 1    gabapentin (NEURONTIN) 300 MG capsule, Take 1 capsule by mouth 3 (Three) Times a Day., Disp: , Rfl:     HYDROcodone-acetaminophen (NORCO) 7.5-325 MG per tablet, Take 1 tablet by mouth 3 (Three) Times a Day., Disp: , Rfl:     linaclotide (Linzess) 72 MCG capsule capsule, Take 1 capsule by mouth Every Morning Before Breakfast., Disp: 90 capsule, Rfl: 1    lisinopril (PRINIVIL,ZESTRIL) 20 MG tablet, Take 1 tablet by mouth Daily., Disp: 90 tablet, Rfl: 1    melatonin 5 MG tablet tablet, Take 1 tablet by mouth., Disp: , Rfl:     metoprolol tartrate (LOPRESSOR) 50 MG tablet, Take 1 tablet by mouth 2 (Two) Times a Day., Disp: 180 tablet, Rfl: 1    ondansetron (Zofran) 8 MG tablet, Take 1 tablet by mouth Every 8 (Eight) Hours As Needed for Nausea or Vomiting., Disp: 12 tablet, Rfl: 1    traZODone (DESYREL) 150 MG tablet, Take 1 tablet by mouth Every Night., Disp: 90 tablet, Rfl: 1    Allergies:   Allergies   Allergen Reactions    Codeine Rash    Morphine Rash       Objective     Vital Signs  /68   Pulse 70   Ht 170.2 cm (67\")   Wt 68 kg (150 lb)   SpO2 96%   BMI 23.49 kg/m²   Estimated body mass index is 23.49 kg/m² as calculated from the following:    Height as of this encounter: 170.2 cm (67\").    Weight as of this encounter: 68 kg (150 lb).    BMI is within normal parameters. No other follow-up for BMI required.      Physical Exam  Vitals and nursing note reviewed.   Constitutional:       " Appearance: Normal appearance.   Cardiovascular:      Rate and Rhythm: Normal rate and regular rhythm.      Heart sounds: No murmur heard.     No friction rub. No gallop.   Pulmonary:      Effort: Pulmonary effort is normal.      Breath sounds: Normal breath sounds. No wheezing, rhonchi or rales.   Musculoskeletal:      Comments: TTP over lower thoracic and lumbar region vertebrae   Neurological:      Mental Status: She is alert.            Procedures     Assessment and Plan     1. Compression fracture of T12 vertebra, sequela    - MRI Lumbar Spine Without Contrast; Future  - MRI Thoracic Spine Without Contrast; Future  - CBC & Differential  - Comprehensive Metabolic Panel  - TSH Rfx On Abnormal To Free T4  - Vitamin B12  - Folate  - Iron and TIBC  - Ferritin  - Lipid Panel  - Hepatitis C Antibody    2. Chronic left-sided low back pain with left-sided sciatica    - MRI Lumbar Spine Without Contrast; Future  - MRI Thoracic Spine Without Contrast; Future  - CBC & Differential  - Comprehensive Metabolic Panel  - TSH Rfx On Abnormal To Free T4  - Vitamin B12  - Folate  - Iron and TIBC  - Ferritin  - Lipid Panel  - Hepatitis C Antibody    3. Other fatigue    - CBC & Differential  - Comprehensive Metabolic Panel  - TSH Rfx On Abnormal To Free T4  - Vitamin B12  - Folate  - Iron and TIBC  - Ferritin  - Lipid Panel  - Hepatitis C Antibody    4. Nausea    - CBC & Differential  - Comprehensive Metabolic Panel  - TSH Rfx On Abnormal To Free T4  - Vitamin B12  - Folate  - Iron and TIBC  - Ferritin  - Lipid Panel  - Hepatitis C Antibody    5. Screening for hyperlipidemia    - CBC & Differential  - Comprehensive Metabolic Panel  - TSH Rfx On Abnormal To Free T4  - Vitamin B12  - Folate  - Iron and TIBC  - Ferritin  - Lipid Panel  - Hepatitis C Antibody    6. Anemia, unspecified type    - CBC & Differential  - Comprehensive Metabolic Panel  - TSH Rfx On Abnormal To Free T4  - Vitamin B12  - Folate  - Iron and TIBC  - Ferritin  -  Lipid Panel  - Hepatitis C Antibody    7. Essential hypertension    - CBC & Differential  - Comprehensive Metabolic Panel  - TSH Rfx On Abnormal To Free T4  - Vitamin B12  - Folate  - Iron and TIBC  - Ferritin  - Lipid Panel  - Hepatitis C Antibody    8. Screening for diabetes mellitus      9. Encounter for screening mammogram for malignant neoplasm of breast    - Mammo Screening Bilateral With CAD; Future       Follow Up  Return if symptoms worsen or fail to improve.    Patient was advised to call the office or seek medical care if  any issues discussed during this visit worsen or persist or if new concerns arise        MD ALICIA Clinton [facility]  [address] [place] [id number] [phone]

## 2024-02-23 ENCOUNTER — TELEPHONE (OUTPATIENT)
Dept: FAMILY MEDICINE CLINIC | Facility: CLINIC | Age: 69
End: 2024-02-23
Payer: MEDICARE

## 2024-02-23 LAB
ALBUMIN SERPL-MCNC: 4.5 G/DL (ref 3.9–4.9)
ALBUMIN/GLOB SERPL: 1.6 {RATIO} (ref 1.2–2.2)
ALP SERPL-CCNC: 130 IU/L (ref 44–121)
ALT SERPL-CCNC: 10 IU/L (ref 0–32)
AST SERPL-CCNC: 17 IU/L (ref 0–40)
BASOPHILS # BLD AUTO: 0 X10E3/UL (ref 0–0.2)
BASOPHILS NFR BLD AUTO: 0 %
BILIRUB SERPL-MCNC: 0.2 MG/DL (ref 0–1.2)
BUN SERPL-MCNC: 17 MG/DL (ref 8–27)
BUN/CREAT SERPL: 19 (ref 12–28)
CALCIUM SERPL-MCNC: 9.7 MG/DL (ref 8.7–10.3)
CHLORIDE SERPL-SCNC: 102 MMOL/L (ref 96–106)
CHOLEST SERPL-MCNC: 130 MG/DL (ref 100–199)
CO2 SERPL-SCNC: 25 MMOL/L (ref 20–29)
CREAT SERPL-MCNC: 0.88 MG/DL (ref 0.57–1)
EGFRCR SERPLBLD CKD-EPI 2021: 72 ML/MIN/1.73
EOSINOPHIL # BLD AUTO: 0.1 X10E3/UL (ref 0–0.4)
EOSINOPHIL NFR BLD AUTO: 2 %
ERYTHROCYTE [DISTWIDTH] IN BLOOD BY AUTOMATED COUNT: 17.8 % (ref 11.7–15.4)
FERRITIN SERPL-MCNC: 16 NG/ML (ref 15–150)
FOLATE SERPL-MCNC: 6.7 NG/ML
GLOBULIN SER CALC-MCNC: 2.9 G/DL (ref 1.5–4.5)
GLUCOSE SERPL-MCNC: 73 MG/DL (ref 70–99)
HCT VFR BLD AUTO: 37.8 % (ref 34–46.6)
HCV IGG SERPL QL IA: NON REACTIVE
HDLC SERPL-MCNC: 63 MG/DL
HGB BLD-MCNC: 11.9 G/DL (ref 11.1–15.9)
IMM GRANULOCYTES # BLD AUTO: 0 X10E3/UL (ref 0–0.1)
IMM GRANULOCYTES NFR BLD AUTO: 0 %
IRON SATN MFR SERPL: 36 % (ref 15–55)
IRON SERPL-MCNC: 129 UG/DL (ref 27–139)
LDLC SERPL CALC-MCNC: 51 MG/DL (ref 0–99)
LYMPHOCYTES # BLD AUTO: 1.7 X10E3/UL (ref 0.7–3.1)
LYMPHOCYTES NFR BLD AUTO: 23 %
MCH RBC QN AUTO: 28.7 PG (ref 26.6–33)
MCHC RBC AUTO-ENTMCNC: 31.5 G/DL (ref 31.5–35.7)
MCV RBC AUTO: 91 FL (ref 79–97)
MONOCYTES # BLD AUTO: 0.8 X10E3/UL (ref 0.1–0.9)
MONOCYTES NFR BLD AUTO: 10 %
NEUTROPHILS # BLD AUTO: 5 X10E3/UL (ref 1.4–7)
NEUTROPHILS NFR BLD AUTO: 65 %
PLATELET # BLD AUTO: 322 X10E3/UL (ref 150–450)
POTASSIUM SERPL-SCNC: 5.1 MMOL/L (ref 3.5–5.2)
PROT SERPL-MCNC: 7.4 G/DL (ref 6–8.5)
RBC # BLD AUTO: 4.15 X10E6/UL (ref 3.77–5.28)
SODIUM SERPL-SCNC: 140 MMOL/L (ref 134–144)
TIBC SERPL-MCNC: 361 UG/DL (ref 250–450)
TRIGL SERPL-MCNC: 85 MG/DL (ref 0–149)
TSH SERPL DL<=0.005 MIU/L-ACNC: 1.49 UIU/ML (ref 0.45–4.5)
UIBC SERPL-MCNC: 232 UG/DL (ref 118–369)
VIT B12 SERPL-MCNC: >2000 PG/ML (ref 232–1245)
VLDLC SERPL CALC-MCNC: 16 MG/DL (ref 5–40)
WBC # BLD AUTO: 7.7 X10E3/UL (ref 3.4–10.8)

## 2024-02-23 NOTE — TELEPHONE ENCOUNTER
"Caller: Chika Pike \"Laura\"    Relationship: Self    Best call back number: [phone]     What specialty or service is being requested: TESTING     What is the office location: [place]     Any additional details: PATIENT STATES SHE WAS GIVEN A REFERRAL FOR A MRI AND ANOTHER TEST ON 2.22.24 BUT SHE IS REQUESTING TO GO TO [place] AND NOT [facility].     PLEASE CALL PATIENT BACK, IF NO ANSWER LEAVE A DETAILED MESSAGE.  "

## 2024-03-13 NOTE — TELEPHONE ENCOUNTER
"  Caller: Chika Pike \"Laura\"    Relationship: Self    Best call back number: [phone]    Caller requesting test results: YES    What test was performed: BLOOD WORK    When was the test performed: MONTH AGO    Where was the test performed: OFFICE            "

## 2024-03-14 ENCOUNTER — TELEPHONE (OUTPATIENT)
Dept: FAMILY MEDICINE CLINIC | Facility: CLINIC | Age: 69
End: 2024-03-14

## 2024-03-14 RX ORDER — LINACLOTIDE 72 UG/1
CAPSULE, GELATIN COATED ORAL
Qty: 90 CAPSULE | Refills: 1 | OUTPATIENT
Start: 2024-03-14

## 2024-03-14 NOTE — TELEPHONE ENCOUNTER
"Caller: Chika Pike \"Laura\"    Relationship: Self    Best call back number: [phone]     What was the call regarding: PATIENT STATES SHE RECEIVED AN ORDER FOR AN MRI AND THAT WAS SENT TO [facility] BUT SHE IS NOT WANTING TO GO THERE AND WOULD LIKE THIS CHANGED TO [facility] IN [place], [place].         "

## 2024-03-14 NOTE — TELEPHONE ENCOUNTER
"Caller: Chika Pike \"Laura\"    Relationship: Self    Best call back number: [phone]     Requested Prescriptions:   Requested Prescriptions     Pending Prescriptions Disp Refills    linaclotide (Linzess) 72 MCG capsule capsule 90 capsule 1     Sig: Take 1 capsule by mouth Every Morning Before Breakfast.        Pharmacy where request should be sent:  [facility] [address] [phone] [facility] [phone]      Last office visit with prescribing clinician: 2/22/2024   Last telemedicine visit with prescribing clinician: Visit date not found   Next office visit with prescribing clinician: 6/28/2024     Additional details provided by patient: PATIENT IS OUT.    Does the patient have less than a 3 day supply:  [x] Yes  [] No    Would you like a call back once the refill request has been completed: [] Yes [x] No    If the office needs to give you a call back, can they leave a voicemail: [] Yes [x] No    Cadance Dunaway, RegSched Rep   03/14/24 09:56 EDT           "

## 2024-03-19 ENCOUNTER — TELEPHONE (OUTPATIENT)
Dept: FAMILY MEDICINE CLINIC | Facility: CLINIC | Age: 69
End: 2024-03-19

## 2024-03-19 NOTE — TELEPHONE ENCOUNTER
"Caller: Chika iPke \"Laura\"    Relationship: Self    Best call back number: [phone]     Caller requesting test results: KOKO    What test was performed: BLOOD WORK    When was the test performed: OVER A MONTH AGO    Where was the test performed: OFFICE LAB    Additional notes: PLEASE CALL THE PATIENT TO DISCUSS THE RESULTS.  "

## 2024-03-19 NOTE — TELEPHONE ENCOUNTER
Your lab results looked good overall. Your Vitamin B12 level was very high, which we often see when people take Vitamin B12 supplements or shots

## 2024-03-21 ENCOUNTER — TELEPHONE (OUTPATIENT)
Dept: FAMILY MEDICINE CLINIC | Facility: CLINIC | Age: 69
End: 2024-03-21

## 2024-03-21 NOTE — TELEPHONE ENCOUNTER
PATIENT REQUESTING A CALL BACK ABOUT GETTING HER MRI SCHEDULED. REFERRAL PUT IN 2/22. PATIENT STATES THIS SHOULD BE GONG TO [facility].

## 2024-03-27 ENCOUNTER — OFFICE VISIT (OUTPATIENT)
Dept: FAMILY MEDICINE CLINIC | Facility: CLINIC | Age: 69
End: 2024-03-27
Payer: MEDICARE

## 2024-03-27 VITALS
DIASTOLIC BLOOD PRESSURE: 58 MMHG | BODY MASS INDEX: 23.49 KG/M2 | OXYGEN SATURATION: 93 % | HEART RATE: 78 BPM | HEIGHT: 67 IN | SYSTOLIC BLOOD PRESSURE: 124 MMHG

## 2024-03-27 DIAGNOSIS — S92.321D CLOSED DISPLACED FRACTURE OF SECOND METATARSAL BONE OF RIGHT FOOT WITH ROUTINE HEALING, SUBSEQUENT ENCOUNTER: Primary | ICD-10-CM

## 2024-03-27 RX ORDER — TIZANIDINE HYDROCHLORIDE 2 MG/1
4 CAPSULE, GELATIN COATED ORAL 3 TIMES DAILY PRN
Qty: 9 CAPSULE | Refills: 0 | Status: SHIPPED | OUTPATIENT
Start: 2024-03-27

## 2024-03-27 NOTE — PROGRESS NOTES
Office Note     Name: Chika Pike    : 1955     MRN: [id number]     Chief Complaint  Toe Pain (Pt is here for fractures in 4 of her toes on the right foot. This happened Saturday after dropping a piece of wood on it. )    Subjective     History of Present Illness:  Chika Pike is a 69 y.o. female who presents today for:    Foot injury:  Patient called she stated she had 3 fractures in her right foot then upon arrival she said she actually had 4 fractures in the toes of her right foot.      When I called to get verbal report from radiology they said she had a single  fracture of the 2nd metatarsal. Will have them fax official read and advised patient to get copy of films /disc    Review of Systems:   Review of Systems    Past Medical History:   Past Medical History:   Diagnosis Date    Ankle fracture     Right    H/O spinal fusion 2024    Phalanges fracture, foot     Right Foot (4 Toes)    Sinus symptom     Sinus Surgery       Past Surgical History:   Past Surgical History:   Procedure Laterality Date    BREAST BIOPSY      Dr Keith Geller [place]    COLONOSCOPY      SINUS SURGERY      SPINAL FUSION         Family History:   Family History   Problem Relation Age of Onset    Diabetes Brother     Breast cancer Maternal Aunt        Social History:   Social History     Socioeconomic History    Marital status:    Tobacco Use    Smoking status: Every Day     Current packs/day: 1.00     Average packs/day: 1 pack/day for 37.3 years (37.3 ttl pk-yrs)     Types: Cigarettes     Start date:      Passive exposure: Current    Smokeless tobacco: Never   Vaping Use    Vaping status: Never Used   Substance and Sexual Activity    Alcohol use: Not Currently    Drug use: Defer       Immunizations:   Immunization History   Administered Date(s) Administered    COVID-19 (PFIZER) Purple Cap Monovalent 2021, 2021    Fluzone Quad >6mos (Multi-dose) 2024    Hepatitis A 2019,  "08/26/2019        Medications:     Current Outpatient Medications:     albuterol sulfate  (90 Base) MCG/ACT inhaler, Inhale 2 puffs 4 (Four) Times a Day., Disp: 18 g, Rfl: 1    amitriptyline (ELAVIL) 25 MG tablet, Take 1 tablet by mouth Every Night., Disp: 90 tablet, Rfl: 1    CBD (cannabidiol) oral oil, Take  by mouth., Disp: , Rfl:     ELDERBERRY PO, Take  by mouth., Disp: , Rfl:     Fluticasone Furoate-Vilanterol 100-25 MCG/ACT aerosol powder , Inhale 1 puff Daily., Disp: 3 each, Rfl: 1    gabapentin (NEURONTIN) 300 MG capsule, Take 1 capsule by mouth 3 (Three) Times a Day., Disp: , Rfl:     HYDROcodone-acetaminophen (NORCO) 7.5-325 MG per tablet, Take 1 tablet by mouth 3 (Three) Times a Day., Disp: , Rfl:     linaclotide (Linzess) 72 MCG capsule capsule, Take 1 capsule by mouth Every Morning Before Breakfast., Disp: 90 capsule, Rfl: 0    lisinopril (PRINIVIL,ZESTRIL) 20 MG tablet, Take 1 tablet by mouth Daily., Disp: 90 tablet, Rfl: 1    melatonin 5 MG tablet tablet, Take 1 tablet by mouth., Disp: , Rfl:     metoprolol tartrate (LOPRESSOR) 50 MG tablet, Take 1 tablet by mouth 2 (Two) Times a Day., Disp: 180 tablet, Rfl: 1    traZODone (DESYREL) 150 MG tablet, Take 1 tablet by mouth Every Night., Disp: 90 tablet, Rfl: 1    ondansetron (Zofran) 8 MG tablet, Take 1 tablet by mouth Every 8 (Eight) Hours As Needed for Nausea or Vomiting. (Patient not taking: Reported on 3/27/2024), Disp: 12 tablet, Rfl: 1    TiZANidine (ZANAFLEX) 2 MG capsule, Take 2 capsules by mouth 3 (Three) Times a Day As Needed for Muscle Spasms., Disp: 9 capsule, Rfl: 0    Allergies:   Allergies   Allergen Reactions    Codeine Rash    Morphine Rash       Objective     Vital Signs  /58   Pulse 78   Ht 170.2 cm (67\")   SpO2 93%   BMI 23.49 kg/m²   Estimated body mass index is 23.49 kg/m² as calculated from the following:    Height as of this encounter: 170.2 cm (67\").    Weight as of 2/22/24: 68 kg (150 lb).    BMI is within " normal parameters. No other follow-up for BMI required.      Physical Exam  Vitals and nursing note reviewed.   Constitutional:       Appearance: Normal appearance.   Cardiovascular:      Rate and Rhythm: Normal rate and regular rhythm.      Heart sounds: No murmur heard.     No friction rub. No gallop.   Pulmonary:      Effort: Pulmonary effort is normal.      Breath sounds: Normal breath sounds. No wheezing, rhonchi or rales.   Skin:     Findings: Bruising (over midfoot) present.   Neurological:      Mental Status: She is alert.            Procedures     Assessment and Plan     1. Closed displaced fracture of second metatarsal bone of right foot with routine healing, subsequent encounter    - Ambulatory Referral to Orthopedic Surgery       We have requested a fax report of patient's radiology report from reina Pisano.  I also called personally the radiology department and got a verbal readout of her recent x-ray from Saturday, 3/23/2024 which is as follows: Acute minimally displaced fracture at the base of the second metatarsal, no other bony acute abnormalities.  Right ankle showed no acute bony abnormalities.    Follow Up  Return if symptoms worsen or fail to improve.    Patient was advised to call the office or seek medical care if  any issues discussed during this visit worsen or persist or if new concerns arise        MD ALICIA [facility]  [address] [facility] [address] [phone]

## 2024-04-03 ENCOUNTER — TELEPHONE (OUTPATIENT)
Dept: FAMILY MEDICINE CLINIC | Facility: CLINIC | Age: 69
End: 2024-04-03

## 2024-04-03 NOTE — TELEPHONE ENCOUNTER
"Relay     \"Patient is scheduled April 9 at 830 with   [facility], [place] [address] [phone] \"                "

## 2024-04-04 ENCOUNTER — TELEPHONE (OUTPATIENT)
Dept: FAMILY MEDICINE CLINIC | Facility: CLINIC | Age: 69
End: 2024-04-04

## 2024-04-04 NOTE — TELEPHONE ENCOUNTER
"  Caller: Chika Pike \"Laura\"    Relationship: Self    Best call back number: [phone]     What is the best time to reach you: ANY    Who are you requesting to speak with (clinical staff, provider,  specific staff member): STAFF    Do you know the name of the person who called: PT    What was the call regarding: PT DOESN'T USE MYCHART. SHE WANTED TO KNOW WHAT THE CALL WAS ABOUT. PT SAID SHE WASN'T GOING TO ORTHO APPT. WAS JUST GOING TO LET HER TOES HEAL.    Is it okay if the provider responds through MyChart: NO    "

## 2024-04-10 NOTE — TELEPHONE ENCOUNTER
"  Caller: Chika Pike \"Alonzo\"    Relationship: Self    Best call back number: [phone]     What is the best time to reach you: ANYTIME    Who are you requesting to speak with (clinical staff, provider,  specific staff member): CLINICAL STAFF    Do you know the name of the person who called: THE PATIENT ALONZO    What was the call regarding: THE PATIENT ADVISED THAT THERE IS A [employer] MESSAGE TO HER AND ADVISED THAT SHE HAS ISSUES WITH [employer] AND IS REQUESTING A CALL BACK TO DISCUSS.     Is it okay if the provider responds through [employer]: N/A    "

## 2024-04-10 NOTE — TELEPHONE ENCOUNTER
Spoke with patient and let her know that the message was regarding the ortho referral that had been made for her . Pt had called the office after the message was sent stating that she did not want an ortho referral.

## 2024-04-18 ENCOUNTER — TELEPHONE (OUTPATIENT)
Dept: FAMILY MEDICINE CLINIC | Facility: CLINIC | Age: 69
End: 2024-04-18

## 2024-04-18 NOTE — TELEPHONE ENCOUNTER
"  Caller: Chika Pike \"Laura\"    Relationship: Self    Best call back number: [phone]    What is the medical concern/diagnosis: PT STATED THAT MRI WAS SUPPOSE TO BE AN OPEN MRI AND WILL NEED TO HAVE IT RESCHEDULED, ALSO SHE WAS TOLD THAT SHE NEEDS ANOTHER REFERRAL FOR NT WITH DR KEATING  "

## 2024-04-23 ENCOUNTER — TELEPHONE (OUTPATIENT)
Dept: FAMILY MEDICINE CLINIC | Facility: CLINIC | Age: 69
End: 2024-04-23
Payer: MEDICARE

## 2024-04-23 NOTE — TELEPHONE ENCOUNTER
Spoke with patient to clarify message sent over by HUB.    Pt states that she had an appointment on 4/17/24 for a MRI but she is clausterphobic  and had to cancel. She bneeds a new referral made for an Open MRI.     Pt states that she needs a new referral to Dr. Rivera for ENT as well.     Thanks

## 2024-04-23 NOTE — TELEPHONE ENCOUNTER
"  Caller: Chika Pike \"Laura\"    Relationship: Self    Best call back number: [phone]    Who are you requesting to speak with (clinical staff, provider,  specific staff member): CLINICAL STAFF    What was the call regarding: PATIENT CALLED [facility] IN [place] AND THEY TOLD HER SHE HAD DONE AN MRI ON 04/17/2024, HOWEVER PATIENT HAS NO KNOWLEDGE OF THIS    "

## 2024-04-29 DIAGNOSIS — J44.9 CHRONIC OBSTRUCTIVE PULMONARY DISEASE, UNSPECIFIED COPD TYPE: ICD-10-CM

## 2024-04-29 NOTE — TELEPHONE ENCOUNTER
"    Caller: Chika Pike \"Laura\"    Relationship to patient: Self    Best call back number: [phone]    Patient is needing: TO FIND OUT IF PCP HAS SENT OPEN MRI ORDER YET; PATIENT WOULD LIKE TO KNOW ABOUT REFERRAL FOR ORTHO WOULD BE POSSIBLE TO MOVE FORWARD; NOT NOT USE  PLEASE    PLEASE ADVISE  "

## 2024-04-29 NOTE — TELEPHONE ENCOUNTER
"    Caller: Faridachelsie Chika \"Laura\"    Relationship: Self    Best call back number:[phone]    Requested Prescriptions:   Requested Prescriptions     Pending Prescriptions Disp Refills    albuterol sulfate  (90 Base) MCG/ACT inhaler 18 g 1     Sig: Inhale 2 puffs 4 (Four) Times a Day.    Fluticasone Furoate-Vilanterol 100-25 MCG/ACT aerosol powder  3 each 1     Sig: Inhale 1 puff Daily.        Pharmacy where request should be sent:  [facility] [address] [phone] [facility] [phone]      Last office visit with prescribing clinician: 3/27/2024   Last telemedicine visit with prescribing clinician: Visit date not found   Next office visit with prescribing clinician: 6/28/2024     Additional details provided by patient: COMPLETELY OUT OF MEDICATIONS    Does the patient have less than a 3 day supply:  [x] Yes  [] No    Would you like a call back once the refill request has been completed: [x] Yes [] No    If the office needs to give you a call back, can they leave a voicemail: [x] Yes [] No    Maverick Castillo Rep   04/29/24 15:53 EDT       "

## 2024-04-30 NOTE — TELEPHONE ENCOUNTER
Spoke with patient. She originally declined ortho on 4/5/24 when contacted but when I spoke with her today she decided she would like to go.    She would like an ortho referral, an ENT referral to Dr. Rivera, and needs a referral for an Open MRI as she is claustrophobic and cancelled her last MRI while there.      Thanks

## 2024-05-01 RX ORDER — FLUTICASONE FUROATE AND VILANTEROL 100; 25 UG/1; UG/1
1 POWDER RESPIRATORY (INHALATION)
Qty: 3 EACH | Refills: 1 | Status: SHIPPED | OUTPATIENT
Start: 2024-05-01

## 2024-05-01 RX ORDER — ALBUTEROL SULFATE 90 UG/1
2 AEROSOL, METERED RESPIRATORY (INHALATION)
Qty: 18 G | Refills: 1 | Status: SHIPPED | OUTPATIENT
Start: 2024-05-01

## 2024-05-01 NOTE — TELEPHONE ENCOUNTER
MRI has already been ordered, please send the order to an open facility to see if it was covered.  Ortho referral was also already ordered but patient declined, please re-fax. Needs appt to discuss ENT referral

## 2024-05-13 ENCOUNTER — TELEPHONE (OUTPATIENT)
Dept: FAMILY MEDICINE CLINIC | Facility: CLINIC | Age: 69
End: 2024-05-13
Payer: MEDICARE

## 2024-05-13 NOTE — TELEPHONE ENCOUNTER
"    Caller: Chika Pike \"Laura\"    Relationship: Self    Best call back number: [phone]     What is the medical concern/diagnosis: OPEN MRI, ORTHOPEDIC SURGEON, ENT ENT FOR VERTIGO ISSUES    What specialty or service is being requested: OPEN MRI, ORTHOPEDIC SURGEON    What is the provider, practice or medical service name: NA    What is the office location: [place]     What is the office phone number: NA    Any additional details: PATIENT IS NEEDING TO HAVE AN OPEN MRI DONE. PATIENT IS ALSO WANTING TO SEE AN ORTHOPEDIC SURGEON BUT DOES NOT WANT TO SEE DR. BARRERA. PATIENT WOULD ALSO LIKE TO SEE DR. GARCIA FOR HER VERTIGO ISSUES.         "

## 2024-05-13 NOTE — TELEPHONE ENCOUNTER
SPOKE TO [facility] WITH INSURANCE AND HAD AUTHORIZATIONS CHANGED. SENT ORDER AND NEW AUTH TO [facility] FOR OPEN MRI. THEY WILL CONTACT PT. PH#[phone]

## 2024-05-15 NOTE — TELEPHONE ENCOUNTER
Left message     Hub to relay  Please schedule her a followup for one day next week. Orders tests and referrals were all palced months ago and romi declined to do them.  The MRI order has already been faxed.  The ortho referral was placed but she didn't go, and I do not know  who Dr Onofre is or what specialty they are so it is likely best to do a followup  next week

## 2024-05-15 NOTE — TELEPHONE ENCOUNTER
"Name: Chika Pike \"Laura\"    Relationship: Self    Best Callback Number: [phone]    HUB PROVIDED THE RELAY MESSAGE FROM THE OFFICE   PATIENT SCHEDULED AS REQUESTED    ADDITIONAL INFORMATION:   "

## 2024-05-15 NOTE — TELEPHONE ENCOUNTER
Please schedule her a followup for one day next week. Orders tests and referrals were all palced months ago and nakul[facility] declined to do them.  The MRI order has already been faxed.  The ortho referral was placed but she didn't go, and I do not know  who Dr Onofre is or what specialty they are so it is likely best to do a followup  next week

## 2024-05-21 ENCOUNTER — OFFICE VISIT (OUTPATIENT)
Dept: FAMILY MEDICINE CLINIC | Facility: CLINIC | Age: 69
End: 2024-05-21
Payer: MEDICARE

## 2024-05-21 VITALS
HEIGHT: 66 IN | DIASTOLIC BLOOD PRESSURE: 68 MMHG | OXYGEN SATURATION: 98 % | BODY MASS INDEX: 24.91 KG/M2 | SYSTOLIC BLOOD PRESSURE: 166 MMHG | WEIGHT: 155 LBS | TEMPERATURE: 98.1 F | HEART RATE: 89 BPM

## 2024-05-21 DIAGNOSIS — M51.26: ICD-10-CM

## 2024-05-21 DIAGNOSIS — R30.0 DYSURIA: ICD-10-CM

## 2024-05-21 DIAGNOSIS — I10 ESSENTIAL HYPERTENSION: ICD-10-CM

## 2024-05-21 DIAGNOSIS — M48.061 SPINAL STENOSIS OF LUMBAR REGION, UNSPECIFIED WHETHER NEUROGENIC CLAUDICATION PRESENT: ICD-10-CM

## 2024-05-21 DIAGNOSIS — S22.080S COMPRESSION FRACTURE OF T12 VERTEBRA, SEQUELA: ICD-10-CM

## 2024-05-21 DIAGNOSIS — R42 VERTIGO: ICD-10-CM

## 2024-05-21 DIAGNOSIS — Z79.891 CHRONIC PRESCRIPTION OPIATE USE: Primary | ICD-10-CM

## 2024-05-21 DIAGNOSIS — F51.01 PRIMARY INSOMNIA: ICD-10-CM

## 2024-05-21 DIAGNOSIS — Z98.1 H/O SPINAL FUSION: ICD-10-CM

## 2024-05-21 LAB
BILIRUB BLD-MCNC: NEGATIVE MG/DL
CLARITY, POC: CLEAR
COLOR UR: YELLOW
EXPIRATION DATE: ABNORMAL
GLUCOSE UR STRIP-MCNC: NEGATIVE MG/DL
KETONES UR QL: NEGATIVE
LEUKOCYTE EST, POC: ABNORMAL
Lab: ABNORMAL
NITRITE UR-MCNC: NEGATIVE MG/ML
PH UR: 7 [PH] (ref 5–8)
PROT UR STRIP-MCNC: NEGATIVE MG/DL
RBC # UR STRIP: ABNORMAL /UL
SP GR UR: 1.01 (ref 1–1.03)
UROBILINOGEN UR QL: ABNORMAL

## 2024-05-21 PROCEDURE — 1125F AMNT PAIN NOTED PAIN PRSNT: CPT | Performed by: INTERNAL MEDICINE

## 2024-05-21 PROCEDURE — 99214 OFFICE O/P EST MOD 30 MIN: CPT | Performed by: INTERNAL MEDICINE

## 2024-05-21 PROCEDURE — 81003 URINALYSIS AUTO W/O SCOPE: CPT | Performed by: INTERNAL MEDICINE

## 2024-05-21 PROCEDURE — 3077F SYST BP >= 140 MM HG: CPT | Performed by: INTERNAL MEDICINE

## 2024-05-21 PROCEDURE — 3078F DIAST BP <80 MM HG: CPT | Performed by: INTERNAL MEDICINE

## 2024-05-21 RX ORDER — MECLIZINE HYDROCHLORIDE 25 MG/1
25 TABLET ORAL 3 TIMES DAILY PRN
Qty: 21 TABLET | Refills: 0 | Status: SHIPPED | OUTPATIENT
Start: 2024-05-21

## 2024-05-21 RX ORDER — LISINOPRIL 20 MG/1
20 TABLET ORAL DAILY
Qty: 90 TABLET | Refills: 1 | Status: SHIPPED | OUTPATIENT
Start: 2024-05-21

## 2024-05-21 RX ORDER — METOPROLOL TARTRATE 50 MG/1
50 TABLET, FILM COATED ORAL 2 TIMES DAILY
Qty: 180 TABLET | Refills: 1 | Status: SHIPPED | OUTPATIENT
Start: 2024-05-21

## 2024-05-21 RX ORDER — TRAZODONE HYDROCHLORIDE 150 MG/1
150 TABLET ORAL NIGHTLY
Qty: 90 TABLET | Refills: 1 | Status: SHIPPED | OUTPATIENT
Start: 2024-05-21

## 2024-05-21 RX ORDER — AMITRIPTYLINE HYDROCHLORIDE 25 MG/1
25 TABLET, FILM COATED ORAL NIGHTLY
Qty: 90 TABLET | Refills: 1 | Status: SHIPPED | OUTPATIENT
Start: 2024-05-21

## 2024-05-21 RX ORDER — NITROFURANTOIN 25; 75 MG/1; MG/1
100 CAPSULE ORAL 2 TIMES DAILY
Qty: 14 CAPSULE | Refills: 0 | Status: SHIPPED | OUTPATIENT
Start: 2024-05-21 | End: 2024-05-28

## 2024-05-21 NOTE — PROGRESS NOTES
Office Note     Name: Chika Pike    : 1955     MRN: [id number]     Chief Complaint  discuss mri  (Pt is here to follow up on mri today. ), Urinary Tract Infection (Pt is here for uti sxs. She is having burning with urination onset 3 days. ), and Dizziness    Subjective     History of Present Illness:  Chika Pike is a 69 y.o. female who presents today for:    Patient has had been having burning with urination for the last several days.  No fever.      Is also been having a recurrence of.  She has been treated by Dr. Rivera in the past with ENT.  She states when she turns her head or looks up she suddenly has sensation of spinning and feels very dizzy.  She has been on medication in the past as well but not recently.    Also like to discuss her MRI results.    Review of Systems:   Review of Systems    Past Medical History:   Past Medical History:   Diagnosis Date    Ankle fracture     Right    H/O spinal fusion 2024    Phalanges fracture, foot     Right Foot (4 Toes)    Sinus symptom     Sinus Surgery       Past Surgical History:   Past Surgical History:   Procedure Laterality Date    [facility]    COLON[facility]      SINUS SURGERY      SPINAL FUSION         Family History:   Family History   Problem Relation Age of Onset    Diabetes Brother     Breast cancer Maternal Aunt        Social History:   Social History     Socioeconomic History    Marital status:    Tobacco Use    Smoking status: Every Day     Current packs/day: 1.00     Average packs/day: 1 pack/day for 37.4 years (37.4 ttl pk-yrs)     Types: Cigarettes     Start date:      Passive exposure: Current    Smokeless tobacco: Never   Vaping Use    Vaping status: Never Used   Substance and Sexual Activity    Alcohol use: Not Currently    Drug use: Defer       Immunizations:   Immunization History   Administered Date(s) Administered    COVID-19 (PFIZER) Purple Cap Monovalent 2021, 2021  "   Fluzone Quad >6mos (Multi-dose) 01/12/2024    Hepatitis A 02/19/2019, 08/26/2019        Medications:     Current Outpatient Medications:     albuterol sulfate  (90 Base) MCG/ACT inhaler, Inhale 2 puffs 4 (Four) Times a Day., Disp: 18 g, Rfl: 1    amitriptyline (ELAVIL) 25 MG tablet, Take 1 tablet by mouth Every Night., Disp: 90 tablet, Rfl: 1    CBD (cannabidiol) oral oil, Take  by mouth., Disp: , Rfl:     ELDERBERRY PO, Take  by mouth., Disp: , Rfl:     Fluticasone Furoate-Vilanterol 100-25 MCG/ACT aerosol powder , Inhale 1 puff Daily., Disp: 3 each, Rfl: 1    gabapentin (NEURONTIN) 300 MG capsule, Take 1 capsule by mouth 3 (Three) Times a Day., Disp: , Rfl:     HYDROcodone-acetaminophen (NORCO) 7.5-325 MG per tablet, Take 1 tablet by mouth 3 (Three) Times a Day., Disp: , Rfl:     linaclotide (Linzess) 72 MCG capsule capsule, Take 1 capsule by mouth Every Morning Before Breakfast., Disp: 90 capsule, Rfl: 1    lisinopril (PRINIVIL,ZESTRIL) 20 MG tablet, Take 1 tablet by mouth Daily., Disp: 90 tablet, Rfl: 1    melatonin 5 MG tablet tablet, Take 1 tablet by mouth., Disp: , Rfl:     metoprolol tartrate (LOPRESSOR) 50 MG tablet, Take 1 tablet by mouth 2 (Two) Times a Day., Disp: 180 tablet, Rfl: 1    traZODone (DESYREL) 150 MG tablet, Take 1 tablet by mouth Every Night., Disp: 90 tablet, Rfl: 1    meclizine (ANTIVERT) 25 MG tablet, Take 1 tablet by mouth 3 (Three) Times a Day As Needed for Dizziness., Disp: 21 tablet, Rfl: 0    nitrofurantoin, macrocrystal-monohydrate, (Macrobid) 100 MG capsule, Take 1 capsule by mouth 2 (Two) Times a Day for 7 days., Disp: 14 capsule, Rfl: 0    Allergies:   Allergies   Allergen Reactions    Codeine Rash    Morphine Rash       Objective     Vital Signs  /68   Pulse 89   Temp 98.1 °F (36.7 °C)   Ht 167.6 cm (66\")   Wt 70.3 kg (155 lb)   SpO2 98%   BMI 25.02 kg/m²   Estimated body mass index is 25.02 kg/m² as calculated from the following:    Height as of this " "encounter: 167.6 cm (66\").    Weight as of this encounter: 70.3 kg (155 lb).            Physical Exam  Vitals and nursing note reviewed.   Constitutional:       Appearance: Normal appearance.   Cardiovascular:      Rate and Rhythm: Normal rate and regular rhythm.      Heart sounds: No murmur heard.     No friction rub. No gallop.   Pulmonary:      Effort: Pulmonary effort is normal.      Breath sounds: Normal breath sounds. No wheezing, rhonchi or rales.   Neurological:      Mental Status: She is alert.            Procedures     Assessment and Plan     Reviewed patient's MRIs in the office today which include lumbosacral MRI showed changes at L2-L5 of posterior fusion and prior decompression pression.  There is mild bilateral foraminal stenosis but no significant spinal canal stenosis.  This lumbar disc degeneration of the unfused segments most significant at the L1-2 level where there is severe Canal Stenosis.    T-spine MRI shows old healed frafture in the lower part of the back and a mild slipped disc     Diagnoses:    ICD-10-CM ICD-9-CM   1. Chronic prescription opiate use  Z79.891 V58.69   2. Dysuria  R30.0 788.1   3. H/O spinal fusion  Z98.1 V45.4   4. Compression fracture of T12 vertebra, sequela  S22.080S 905.1   5. Herniation of intervertebral disc at L1-L2 level  M51.26 722.10   6. Spinal stenosis of lumbar region, unspecified whether neurogenic claudication present  M48.061 724.02   7. Vertigo  R42 780.4   8. Primary insomnia  F51.01 307.42   9. Essential hypertension  I10 401.9       Plan:  1. Chronic prescription opiate use      2. Dysuria    - POC Urinalysis Dipstick, Automated  - Urine Culture - Urine, Urine, Clean Catch    3. H/O spinal fusion    - Ambulatory Referral to Neurosurgery    4. Compression fracture of T12 vertebra, sequela    - Ambulatory Referral to Neurosurgery    5. Herniation of intervertebral disc at L1-L2 level    - Ambulatory Referral to Neurosurgery    6. Spinal stenosis of lumbar " region, unspecified whether neurogenic claudication present    - Ambulatory Referral to Neurosurgery    7. Vertigo    - meclizine (ANTIVERT) 25 MG tablet; Take 1 tablet by mouth 3 (Three) Times a Day As Needed for Dizziness.  Dispense: 21 tablet; Refill: 0  - Ambulatory Referral to ENT (Otolaryngology)    8. Primary insomnia    - amitriptyline (ELAVIL) 25 MG tablet; Take 1 tablet by mouth Every Night.  Dispense: 90 tablet; Refill: 1  - traZODone (DESYREL) 150 MG tablet; Take 1 tablet by mouth Every Night.  Dispense: 90 tablet; Refill: 1    9. Essential hypertension    - lisinopril (PRINIVIL,ZESTRIL) 20 MG tablet; Take 1 tablet by mouth Daily.  Dispense: 90 tablet; Refill: 1  - metoprolol tartrate (LOPRESSOR) 50 MG tablet; Take 1 tablet by mouth 2 (Two) Times a Day.  Dispense: 180 tablet; Refill: 1       Follow Up  No follow-ups on file.    Patient was advised to call the office or seek medical care if  any issues discussed during this visit worsen or persist or if new concerns arise        Carole Trevizo MD  [facility]  [address] [facility] [address] [phone]

## 2024-05-24 LAB
BACTERIA UR CULT: ABNORMAL
BACTERIA UR CULT: ABNORMAL
OTHER ANTIBIOTIC SUSC ISLT: ABNORMAL

## 2024-05-30 RX ORDER — CEFDINIR 300 MG/1
300 CAPSULE ORAL 2 TIMES DAILY
Qty: 14 CAPSULE | Refills: 0 | Status: SHIPPED | OUTPATIENT
Start: 2024-05-30

## 2024-06-07 DIAGNOSIS — I10 ESSENTIAL HYPERTENSION: ICD-10-CM

## 2024-06-07 DIAGNOSIS — F51.01 PRIMARY INSOMNIA: ICD-10-CM

## 2024-06-11 RX ORDER — AMITRIPTYLINE HYDROCHLORIDE 25 MG/1
TABLET, FILM COATED ORAL
Qty: 90 TABLET | Refills: 1 | OUTPATIENT
Start: 2024-06-11

## 2024-06-11 RX ORDER — TRAZODONE HYDROCHLORIDE 150 MG/1
150 TABLET ORAL NIGHTLY
Qty: 90 TABLET | Refills: 1 | OUTPATIENT
Start: 2024-06-11

## 2024-06-11 RX ORDER — LINACLOTIDE 72 UG/1
CAPSULE, GELATIN COATED ORAL
Qty: 90 CAPSULE | Refills: 0 | OUTPATIENT
Start: 2024-06-11

## 2024-06-11 RX ORDER — LISINOPRIL 20 MG/1
20 TABLET ORAL DAILY
Qty: 90 TABLET | Refills: 1 | OUTPATIENT
Start: 2024-06-11

## 2024-06-14 ENCOUNTER — OFFICE VISIT (OUTPATIENT)
Dept: NEUROSURGERY | Facility: CLINIC | Age: 69
End: 2024-06-14
Payer: MEDICARE

## 2024-06-14 ENCOUNTER — TELEPHONE (OUTPATIENT)
Dept: FAMILY MEDICINE CLINIC | Facility: CLINIC | Age: 69
End: 2024-06-14
Payer: MEDICARE

## 2024-06-14 VITALS — TEMPERATURE: 98 F | BODY MASS INDEX: 25.55 KG/M2 | HEIGHT: 66 IN | WEIGHT: 159 LBS

## 2024-06-14 DIAGNOSIS — J44.9 CHRONIC OBSTRUCTIVE PULMONARY DISEASE, UNSPECIFIED COPD TYPE: ICD-10-CM

## 2024-06-14 DIAGNOSIS — Z98.1 HISTORY OF LUMBAR FUSION: Primary | ICD-10-CM

## 2024-06-14 DIAGNOSIS — F51.01 PRIMARY INSOMNIA: ICD-10-CM

## 2024-06-14 DIAGNOSIS — I10 ESSENTIAL HYPERTENSION: ICD-10-CM

## 2024-06-14 RX ORDER — GABAPENTIN 400 MG/1
400 CAPSULE ORAL 3 TIMES DAILY
COMMUNITY

## 2024-06-14 NOTE — PROGRESS NOTES
Patient: Chika Pike  : 1955    Primary Care Provider: Carole Trevizo MD    Requesting Provider: As above        History    Chief Complaint: Chronic low back pain.    History of Present Illness: Ms. Pike is a 69-year-old unemployed woman with chronic back difficulties dating back some 15 years.  About a decade ago she underwent lumbar fusion by Dr. Palomares.  This made her worse.  She continues to smoke.  The pain extends into the left hip  as a stabbing pain.  She also has some pain in the left anterior thigh.  She has right hip pain.  Any activity or position gives her pain.  She is better with rest and when using pain medicines.  Therapy and injections have not been helpful.  She denies bowel or bladder dysfunction.    Review of Systems   Constitutional:  Negative for activity change, appetite change, chills, diaphoresis, fatigue, fever and unexpected weight change.   HENT:  Negative for congestion, dental problem, drooling, ear discharge, ear pain, facial swelling, hearing loss, mouth sores, nosebleeds, postnasal drip, rhinorrhea, sinus pressure, sinus pain, sneezing, sore throat, tinnitus, trouble swallowing and voice change.    Eyes:  Negative for photophobia, pain, discharge, redness, itching and visual disturbance.   Respiratory:  Negative for apnea, cough, choking, chest tightness, shortness of breath, wheezing and stridor.    Cardiovascular:  Negative for chest pain, palpitations and leg swelling.   Gastrointestinal:  Negative for abdominal distention, abdominal pain, anal bleeding, blood in stool, constipation, diarrhea, nausea, rectal pain and vomiting.   Endocrine: Negative for cold intolerance, heat intolerance, polydipsia, polyphagia and polyuria.   Genitourinary:  Negative for decreased urine volume, difficulty urinating, dyspareunia, dysuria, enuresis, flank pain, frequency, genital sores, hematuria, menstrual problem, pelvic pain, urgency, vaginal bleeding, vaginal discharge and vaginal  "pain.   Musculoskeletal:  Positive for back pain. Negative for arthralgias, gait problem, joint swelling, myalgias, neck pain and neck stiffness.   Skin:  Negative for color change, pallor, rash and wound.   Allergic/Immunologic: Negative for environmental allergies, food allergies and immunocompromised state.   Neurological:  Negative for dizziness, tremors, seizures, syncope, facial asymmetry, speech difficulty, weakness, light-headedness, numbness and headaches.   Hematological:  Negative for adenopathy. Does not bruise/bleed easily.   Psychiatric/Behavioral:  Negative for agitation, behavioral problems, confusion, decreased concentration, dysphoric mood, hallucinations, self-injury, sleep disturbance and suicidal ideas. The patient is not nervous/anxious and is not hyperactive.      The patient's past medical history, past surgical history, family history, and social history have been reviewed at length in the electronic medical record.      Physical Exam:   Ht 167.6 cm (66\")   Wt 72.1 kg (159 lb)   BMI 25.66 kg/m²   The patient moves about independently but somewhat awkwardly.  Right leg raising is negative.  Manan signs are negative.  Right knee reflex is difficult to elicit.  Other reflexes are 1+.  Midline and parasagittal lengthy vertical incisions identified.    Medical Decision Making    Data Review:   (All imaging studies were personally reviewed unless stated otherwise)  MRI of the thoracic spine dated 5/16/2024 demonstrates an old wedge deformity of T12 as well as some degenerative disc disease.    MRI of the lumbar spine from the same date demonstrates her hardware construct from L2-5.  There is moderate stenosis at L1-2 above her prior construct.    Diagnosis:   1.  Chronic mechanical low back pain.  2.  Lumbar postlaminectomy syndrome.  3.  Tobacco abuse.    Treatment Options:   Presently, there is no role for surgical intervention and formal pain management will need to continue.  She will " follow-up with neurosurgery on an as-needed basis.  I discussed the importance of smoking cessation as it relates to her spinal issues.          I, Dr. Giordano, personally performed the services described in the documentation, as scribed in my presence, and it is both accurate and complete.

## 2024-06-14 NOTE — TELEPHONE ENCOUNTER
"Caller: Shahzad Chika \"Laura\"    Relationship: Self    Best call back number: [phone]     Requested Prescriptions:   Requested Prescriptions      No prescriptions requested or ordered in this encounter    PRESCRIPTIONS FROM APPOINTMENT ON 5.21.24.     Pharmacy where request should be sent:  [facility] [address] - [phone] [facility] [phone]      Last office visit with prescribing clinician: 5/21/2024   Last telemedicine visit with prescribing clinician: Visit date not found   Next office visit with prescribing clinician: 11/26/2024     Additional details provided by patient: PATIENT STATES HER PRESCRIPTIONS WERE SENT TO THE INCORRECT PHARMACY.     Maverick Sheppard Rep   06/14/24 14:56 EDT   "

## 2024-06-19 RX ORDER — FLUTICASONE FUROATE AND VILANTEROL 100; 25 UG/1; UG/1
1 POWDER RESPIRATORY (INHALATION)
Qty: 3 EACH | Refills: 1 | Status: SHIPPED | OUTPATIENT
Start: 2024-06-19

## 2024-06-19 RX ORDER — AMITRIPTYLINE HYDROCHLORIDE 25 MG/1
25 TABLET, FILM COATED ORAL NIGHTLY
Qty: 90 TABLET | Refills: 1 | Status: SHIPPED | OUTPATIENT
Start: 2024-06-19

## 2024-06-19 RX ORDER — ALBUTEROL SULFATE 90 UG/1
2 AEROSOL, METERED RESPIRATORY (INHALATION)
Qty: 18 G | Refills: 1 | Status: SHIPPED | OUTPATIENT
Start: 2024-06-19

## 2024-06-19 RX ORDER — LISINOPRIL 20 MG/1
20 TABLET ORAL DAILY
Qty: 90 TABLET | Refills: 1 | Status: SHIPPED | OUTPATIENT
Start: 2024-06-19

## 2024-06-19 RX ORDER — TRAZODONE HYDROCHLORIDE 150 MG/1
150 TABLET ORAL NIGHTLY
Qty: 90 TABLET | Refills: 1 | Status: SHIPPED | OUTPATIENT
Start: 2024-06-19

## 2024-06-19 RX ORDER — METOPROLOL TARTRATE 50 MG/1
50 TABLET, FILM COATED ORAL 2 TIMES DAILY
Qty: 180 TABLET | Refills: 1 | Status: SHIPPED | OUTPATIENT
Start: 2024-06-19

## 2024-07-24 DIAGNOSIS — I10 ESSENTIAL HYPERTENSION: ICD-10-CM

## 2024-07-24 RX ORDER — LISINOPRIL 20 MG/1
20 TABLET ORAL DAILY
Qty: 90 TABLET | Refills: 0 | Status: SHIPPED | OUTPATIENT
Start: 2024-07-24

## 2024-07-24 NOTE — TELEPHONE ENCOUNTER
"Caller: Chika Pike \"Laura\"    Relationship: Self    Best call back number: [phone]     Requested Prescriptions:   Requested Prescriptions     Pending Prescriptions Disp Refills    lisinopril (PRINIVIL,ZESTRIL) 20 MG tablet 90 tablet 1     Sig: Take 1 tablet by mouth Daily.    PRESCRIPTION FOR VERTIGO     Pharmacy where request should be sent:  [facility] [address] [phone] [facility] [phone]      Last office visit with prescribing clinician: 5/21/2024   Last telemedicine visit with prescribing clinician: Visit date not found   Next office visit with prescribing clinician: 11/26/2024     Maverick Sheppard Rep   07/24/24 10:56 EDT   "

## 2024-08-05 ENCOUNTER — TELEPHONE (OUTPATIENT)
Dept: FAMILY MEDICINE CLINIC | Facility: CLINIC | Age: 69
End: 2024-08-05

## 2024-08-05 NOTE — TELEPHONE ENCOUNTER
LVM letting patient know she will need to be seen in office for evaluation before any medications can be sent.

## 2024-08-05 NOTE — TELEPHONE ENCOUNTER
"Caller: Chika Pike \"Laura\"    Relationship: Self    Best call back number: [phone]     What medication are you requesting: MUSCLE RELAXER    What are your current symptoms: SEVERE BACK PAIN    How long have you been experiencing symptoms: 3 DAYS    Have you had these symptoms before:    [x] Yes  [] No    Have you been treated for these symptoms before:   [x] Yes  [] No    If a prescription is needed, what is your preferred pharmacy and phone number: [facility] [place] [address] [address] [facility] [address] - [phone]  - [phone] FX     Additional notes:  PATIENT HAS CALLED AND STATED SHE IS SUFFERING FROM SEVERE MUSCLE SPASMS AND IS REQUESTING IF A PRESCRIPTION FOR MUSCLE RELAXER BE CALLED INTO PHARMACY. PATIENT DOES NOT WANT FLEXERIL DUE TO IT MAKES HER SICK. PATIENT IS REQUESTING A CALL BACK EITHER WAY TO LET HER KNOW IF SOMETHING WILL BE CALLED IN.        "

## 2024-09-10 ENCOUNTER — TELEPHONE (OUTPATIENT)
Dept: FAMILY MEDICINE CLINIC | Facility: CLINIC | Age: 69
End: 2024-09-10
Payer: MEDICARE

## 2024-09-10 NOTE — TELEPHONE ENCOUNTER
Caller: ZHENG    Relationship: DAUGHTER (NOT ON [facility])    Best call back number: [phone]    Who are you requesting to speak with (clinical staff, provider,  specific staff member): CLINICAL STAFF    What was the call regarding: WONDERS IS THERE A TEST TO CHECK FOR DEMENTIA?

## 2024-09-26 DIAGNOSIS — F51.01 PRIMARY INSOMNIA: ICD-10-CM

## 2024-10-17 DIAGNOSIS — F51.01 PRIMARY INSOMNIA: ICD-10-CM

## 2024-11-14 ENCOUNTER — OFFICE VISIT (OUTPATIENT)
Dept: FAMILY MEDICINE CLINIC | Facility: CLINIC | Age: 69
End: 2024-11-14
Payer: MEDICARE

## 2024-11-14 VITALS
HEIGHT: 66 IN | BODY MASS INDEX: 25.73 KG/M2 | HEART RATE: 66 BPM | SYSTOLIC BLOOD PRESSURE: 122 MMHG | OXYGEN SATURATION: 97 % | DIASTOLIC BLOOD PRESSURE: 70 MMHG | WEIGHT: 160.1 LBS

## 2024-11-14 DIAGNOSIS — R42 VERTIGO: ICD-10-CM

## 2024-11-14 DIAGNOSIS — I10 ESSENTIAL HYPERTENSION: ICD-10-CM

## 2024-11-14 DIAGNOSIS — N28.1 RENAL CYST, LEFT: ICD-10-CM

## 2024-11-14 DIAGNOSIS — Z87.440 HISTORY OF UTI: ICD-10-CM

## 2024-11-14 DIAGNOSIS — R30.0 DYSURIA: Primary | ICD-10-CM

## 2024-11-14 DIAGNOSIS — F51.01 PRIMARY INSOMNIA: ICD-10-CM

## 2024-11-14 DIAGNOSIS — L08.82 OMPHALITIS IN ADULT: ICD-10-CM

## 2024-11-14 DIAGNOSIS — R19.8 UMBILICUS DISCHARGE: ICD-10-CM

## 2024-11-14 LAB
BILIRUB BLD-MCNC: NEGATIVE MG/DL
CLARITY, POC: CLEAR
COLOR UR: YELLOW
GLUCOSE UR STRIP-MCNC: NEGATIVE MG/DL
KETONES UR QL: NEGATIVE
LEUKOCYTE EST, POC: NEGATIVE
NITRITE UR-MCNC: NEGATIVE MG/ML
PH UR: 7 [PH] (ref 5–8)
PROT UR STRIP-MCNC: NEGATIVE MG/DL
RBC # UR STRIP: NEGATIVE /UL
SP GR UR: 1.01 (ref 1–1.03)
UROBILINOGEN UR QL: NORMAL

## 2024-11-14 PROCEDURE — 81002 URINALYSIS NONAUTO W/O SCOPE: CPT | Performed by: INTERNAL MEDICINE

## 2024-11-14 PROCEDURE — 3078F DIAST BP <80 MM HG: CPT | Performed by: INTERNAL MEDICINE

## 2024-11-14 PROCEDURE — 3074F SYST BP LT 130 MM HG: CPT | Performed by: INTERNAL MEDICINE

## 2024-11-14 PROCEDURE — 1125F AMNT PAIN NOTED PAIN PRSNT: CPT | Performed by: INTERNAL MEDICINE

## 2024-11-14 PROCEDURE — 99213 OFFICE O/P EST LOW 20 MIN: CPT | Performed by: INTERNAL MEDICINE

## 2024-11-14 RX ORDER — MECLIZINE HYDROCHLORIDE 25 MG/1
25 TABLET ORAL 3 TIMES DAILY PRN
Qty: 90 TABLET | Refills: 1 | Status: SHIPPED | OUTPATIENT
Start: 2024-11-14 | End: 2024-11-15 | Stop reason: SDUPTHER

## 2024-11-14 RX ORDER — LISINOPRIL 20 MG/1
20 TABLET ORAL DAILY
Qty: 90 TABLET | Refills: 0 | Status: SHIPPED | OUTPATIENT
Start: 2024-11-14 | End: 2024-12-04 | Stop reason: SDUPTHER

## 2024-11-14 RX ORDER — MECLIZINE HYDROCHLORIDE 25 MG/1
25 TABLET ORAL 3 TIMES DAILY PRN
Qty: 90 TABLET | Refills: 1 | Status: CANCELLED | OUTPATIENT
Start: 2024-11-14

## 2024-11-14 RX ORDER — SULFAMETHOXAZOLE AND TRIMETHOPRIM 800; 160 MG/1; MG/1
1 TABLET ORAL 2 TIMES DAILY
Qty: 14 TABLET | Refills: 0 | Status: SHIPPED | OUTPATIENT
Start: 2024-11-14

## 2024-11-14 RX ORDER — METOPROLOL TARTRATE 50 MG
50 TABLET ORAL 2 TIMES DAILY
Qty: 180 TABLET | Refills: 1 | Status: SHIPPED | OUTPATIENT
Start: 2024-11-14 | End: 2024-12-04 | Stop reason: SDUPTHER

## 2024-11-14 NOTE — PROGRESS NOTES
Office Note     Name: Chika Pike    : 1955     MRN: [id number]     Chief Complaint  Back Pain (C/O of low back pain and she states couple days ago she had blood coming out of her belly button and had a odor and pus she said this morning it was clear.)    Subjective     History of Present Illness:  Chika Pike is a 69 y.o. female who presents today for:    History of Present Illness  The patient is a 69-year-old female who presents for evaluation of urinary issues and umbilical discharge.    She reports experiencing umbilical discharge that began three days ago. Initially, the discharge was bloody, but it has since transitioned to a clear fluid. She has been managing this with peroxide. She also mentions that the skin around her umbilicus was red a few days ago. This is her first encounter with such a symptom.    A few weeks ago, she experienced severe pain in the area where a previous test revealed a spot on her kidney. She notes tenderness in her abdomen and has been consuming water and beer.    She has a history of Chronic Obstructive Pulmonary Disease (COPD) and vertigo.    ALLERGIES  She is allergic to CODEINE and MORPHINE.      Review of Systems:   Review of Systems    Past Medical History:   Past Medical History:   Diagnosis Date    Ankle fracture     Right    Asthma     H/O spinal fusion 2024    Hypertension     Phalanges fracture, foot     Right Foot (4 Toes)    Sinus symptom     Sinus Surgery       Past Surgical History:   Past Surgical History:   Procedure Laterality Date    BREAST BIOPSY      Dr Keith Geller [place]    COLONOSCOPY      SINUS SURGERY      SPINAL FUSION         Family History:   Family History   Problem Relation Age of Onset    Diabetes Brother     Breast cancer Maternal Aunt        Social History:   Social History     Socioeconomic History    Marital status:    Tobacco Use    Smoking status: Every Day     Current packs/day: 1.00     Average packs/day: 1  pack/day for 37.9 years (37.9 ttl pk-yrs)     Types: Cigarettes     Start date: 1987     Passive exposure: Current    Smokeless tobacco: Never   Vaping Use    Vaping status: Never Used   Substance and Sexual Activity    Alcohol use: Not Currently    Drug use: Defer    Sexual activity: Defer       Immunizations:   Immunization History   Administered Date(s) Administered    COVID-19 (PFIZER) Purple Cap Monovalent 08/30/2021, 09/22/2021    Fluzone Quad >6mos (Multi-dose) 01/12/2024    Hepatitis A 02/19/2019, 08/26/2019        Medications:     Current Outpatient Medications:     albuterol sulfate  (90 Base) MCG/ACT inhaler, Inhale 2 puffs 4 (Four) Times a Day., Disp: 18 g, Rfl: 1    amitriptyline (ELAVIL) 25 MG tablet, Take 1 tablet by mouth At Night As Needed for Sleep., Disp: 90 tablet, Rfl: 1    CBD (cannabidiol) oral oil, Take  by mouth., Disp: , Rfl:     ELDERBERRY PO, Take  by mouth., Disp: , Rfl:     Fluticasone Furoate-Vilanterol 100-25 MCG/ACT aerosol powder , Inhale 1 puff Daily., Disp: 3 each, Rfl: 1    gabapentin (NEURONTIN) 400 MG capsule, Take 1 capsule by mouth 3 (Three) Times a Day., Disp: , Rfl:     HYDROcodone-acetaminophen (NORCO) 7.5-325 MG per tablet, Take 1 tablet by mouth 3 (Three) Times a Day., Disp: , Rfl:     linaclotide (Linzess) 72 MCG capsule capsule, Take 1 capsule by mouth Every Morning Before Breakfast., Disp: 90 capsule, Rfl: 1    lisinopril (PRINIVIL,ZESTRIL) 20 MG tablet, Take 1 tablet by mouth Daily., Disp: 90 tablet, Rfl: 0    melatonin 5 MG tablet tablet, Take 1 tablet by mouth., Disp: , Rfl:     metoprolol tartrate (LOPRESSOR) 50 MG tablet, Take 1 tablet by mouth 2 (Two) Times a Day., Disp: 180 tablet, Rfl: 1    traZODone (DESYREL) 150 MG tablet, Take 1 tablet by mouth Every Night., Disp: 90 tablet, Rfl: 1    meclizine (ANTIVERT) 25 MG tablet, Take 1 tablet by mouth 3 (Three) Times a Day As Needed for Dizziness., Disp: 90 tablet, Rfl: 1    sulfamethoxazole-trimethoprim  "(Bactrim DS) 800-160 MG per tablet, Take 1 tablet by mouth 2 (Two) Times a Day., Disp: 14 tablet, Rfl: 0    Allergies:   Allergies   Allergen Reactions    Codeine Rash    Morphine Rash       Objective     Vital Signs  /70   Pulse 66   Ht 167.6 cm (66\")   Wt 72.6 kg (160 lb 1.6 oz)   SpO2 97%   BMI 25.84 kg/m²   Estimated body mass index is 25.84 kg/m² as calculated from the following:    Height as of this encounter: 167.6 cm (66\").    Weight as of this encounter: 72.6 kg (160 lb 1.6 oz).    Vital Signs were reviewed.            Physical Exam  Vitals and nursing note reviewed.   Constitutional:       Appearance: Normal appearance.   Cardiovascular:      Rate and Rhythm: Normal rate and regular rhythm.      Heart sounds: No murmur heard.     No friction rub. No gallop.   Pulmonary:      Effort: Pulmonary effort is normal.      Breath sounds: Normal breath sounds. No wheezing, rhonchi or rales.   Abdominal:      General: Abdomen is flat.      Palpations: There is no hepatomegaly or splenomegaly.      Tenderness: Left CVA tenderness: no ulcer or fluid seen at base of umbulicus at this time.      Hernia: There is no hernia in the umbilical area.   Neurological:      Mental Status: She is alert.        Physical Exam         Results      Procedures     Assessment and Plan     Diagnoses:    ICD-10-CM ICD-9-CM   1. Dysuria  R30.0 788.1   2. Omphalitis in adult  L08.82 686.9   3. History of UTI  Z87.440 V13.02   4. Renal cyst, left  N28.1 753.10   5. Umbilicus discharge  R19.8 789.9   6. Primary insomnia  F51.01 307.42   7. Essential hypertension  I10 401.9   8. Vertigo  R42 780.4       Plan:    1. Dysuria    - POC Urinalysis Dipstick  - sulfamethoxazole-trimethoprim (Bactrim DS) 800-160 MG per tablet; Take 1 tablet by mouth 2 (Two) Times a Day.  Dispense: 14 tablet; Refill: 0  - Urine Culture - Urine, Urine, Clean Catch  - US Abdomen Complete; Future    2. Omphalitis in adult    - POC Urinalysis Dipstick  - " sulfamethoxazole-trimethoprim (Bactrim DS) 800-160 MG per tablet; Take 1 tablet by mouth 2 (Two) Times a Day.  Dispense: 14 tablet; Refill: 0  - Urine Culture - Urine, Urine, Clean Catch  - US Abdomen Complete; Future    3. History of UTI    - POC Urinalysis Dipstick  - sulfamethoxazole-trimethoprim (Bactrim DS) 800-160 MG per tablet; Take 1 tablet by mouth 2 (Two) Times a Day.  Dispense: 14 tablet; Refill: 0  - Urine Culture - Urine, Urine, Clean Catch  - US Abdomen Complete; Future    4. Renal cyst, left    - POC Urinalysis Dipstick  - sulfamethoxazole-trimethoprim (Bactrim DS) 800-160 MG per tablet; Take 1 tablet by mouth 2 (Two) Times a Day.  Dispense: 14 tablet; Refill: 0  - Urine Culture - Urine, Urine, Clean Catch  - US Abdomen Complete; Future    5. Umbilicus discharge    - POC Urinalysis Dipstick  - sulfamethoxazole-trimethoprim (Bactrim DS) 800-160 MG per tablet; Take 1 tablet by mouth 2 (Two) Times a Day.  Dispense: 14 tablet; Refill: 0  - Urine Culture - Urine, Urine, Clean Catch  - US Abdomen Complete; Future    6. Primary insomnia    - amitriptyline (ELAVIL) 25 MG tablet; Take 1 tablet by mouth At Night As Needed for Sleep.  Dispense: 90 tablet; Refill: 1    7. Essential hypertension    - lisinopril (PRINIVIL,ZESTRIL) 20 MG tablet; Take 1 tablet by mouth Daily.  Dispense: 90 tablet; Refill: 0  - metoprolol tartrate (LOPRESSOR) 50 MG tablet; Take 1 tablet by mouth 2 (Two) Times a Day.  Dispense: 180 tablet; Refill: 1    8. Vertigo    meclizine (ANTIVERT) 25 MG tablet          Take 1 tablet by mouth 3 (Three) Times a Day As Needed for Dizziness., Starting Thu 11/14/2024, Until Fri 11/15/2024, Normal          Assessment & Plan  1. Umbilical discharge.  The discharge from the umbilicus started 3 days ago, initially bloody, then brown, and now clear. It is suspected to be due to an infection. The area around the umbilicus was red a couple of days ago but appears to be healing. An ultrasound of the kidneys,  abdomen, and umbilicus will be ordered to ensure the infection is superficial and not extending deeper into the abdomen. Bactrim will be prescribed as it is effective for both skin and urinary tract infections. She is not allergic to Bactrim or sulfa.    2. Right-sided back pain.  She reports right-sided back pain, which has been present for a couple of weeks. The area is tender upon examination. An ultrasound of the kidneys will be ordered to investigate further. Bactrim will be prescribed to cover any potential kidney infection.    3. COPD.  She has a history of COPD but reports no new symptoms such as cough or trouble breathing. Continue current management.           Follow Up  No follow-ups on file.    Patient was advised to call the office or seek medical care if  any issues discussed during this visit worsen or persist or if new concerns arise    Patient or patient representative verbalized consent for the use of Ambient Listening during the visit with  Carole Trevizo MD for chart documentation. 12/3/2024  08:10 WALLACE Trevizo MD  [facility]  [address] [place] [address] [id number] [phone]

## 2024-11-14 NOTE — TELEPHONE ENCOUNTER
"Caller: Chika Pike \"Laura\"    Relationship: Self    Best call back number: [phone]     Requested Prescriptions:   Requested Prescriptions     Pending Prescriptions Disp Refills    meclizine (ANTIVERT) 25 MG tablet 90 tablet 1     Sig: Take 1 tablet by mouth 3 (Three) Times a Day As Needed for Dizziness.        Pharmacy where request should be sent: [address] - [phone]  - [phone] FX     Last office visit with prescribing clinician: 11/14/2024   Last telemedicine visit with prescribing clinician: Visit date not found   Next office visit with prescribing clinician: 12/4/2024     Additional details provided by patient: PATIENT CALLED TO ASK FOR A REFILL OF HER VERTIGO MEDICATION      Does the patient have less than a 3 day supply:  [] Yes  [x] No    Would you like a call back once the refill request has been completed: [] Yes [x] No    If the office needs to give you a call back, can they leave a voicemail: [] Yes [x] No    Maverick Smith Rep   11/14/24 12:57 EST     "

## 2024-11-15 ENCOUNTER — TELEPHONE (OUTPATIENT)
Dept: FAMILY MEDICINE CLINIC | Facility: CLINIC | Age: 69
End: 2024-11-15
Payer: MEDICARE

## 2024-11-15 DIAGNOSIS — R42 VERTIGO: ICD-10-CM

## 2024-11-15 RX ORDER — MECLIZINE HYDROCHLORIDE 25 MG/1
25 TABLET ORAL 3 TIMES DAILY PRN
Qty: 90 TABLET | Refills: 1 | Status: SHIPPED | OUTPATIENT
Start: 2024-11-15

## 2024-11-15 NOTE — TELEPHONE ENCOUNTER
"Caller: Chika Pike \"Laura\"    Relationship: Self    Best call back number:      Which medication are you concerned about: MEDICATION FOR VERTIGO    Who prescribed you this medication: DR JEFF SHORE    When did you start taking this medication:ONGOING    What are your concerns: PATIENT ADVISED THIS WAS NOT CALLED INTO THE PHARMACY  [facility] [place] [address] [address] [facility] [address] - [phone]  - [phone]       "

## 2024-11-16 LAB
BACTERIA UR CULT: NO GROWTH
BACTERIA UR CULT: NORMAL

## 2024-11-25 ENCOUNTER — TELEPHONE (OUTPATIENT)
Dept: FAMILY MEDICINE CLINIC | Facility: CLINIC | Age: 69
End: 2024-11-25
Payer: MEDICARE

## 2024-11-25 NOTE — TELEPHONE ENCOUNTER
"    Name: Chika Pike \"Laura\"    Relationship: Self    Best Callback Number: [phone]     HUB PROVIDED THE RELAY MESSAGE FROM THE OFFICE   PATIENT VOICED UNDERSTANDING AND HAS NO FURTHER QUESTIONS AT THIS TIME    ADDITIONAL INFORMATION:   "

## 2024-11-25 NOTE — TELEPHONE ENCOUNTER
"Relay     \"Left msg for patient.     Patient is scheduled for her abdomen ultrasound at 12/11 at 10am at [facility].   [address] [facility], [place] [address]  Phone: [phone] \"                "

## 2024-12-04 ENCOUNTER — OFFICE VISIT (OUTPATIENT)
Dept: FAMILY MEDICINE CLINIC | Facility: CLINIC | Age: 69
End: 2024-12-04
Payer: MEDICARE

## 2024-12-04 VITALS
HEIGHT: 66 IN | SYSTOLIC BLOOD PRESSURE: 126 MMHG | OXYGEN SATURATION: 98 % | BODY MASS INDEX: 25.31 KG/M2 | HEART RATE: 62 BPM | WEIGHT: 157.5 LBS | DIASTOLIC BLOOD PRESSURE: 68 MMHG

## 2024-12-04 DIAGNOSIS — J44.9 CHRONIC OBSTRUCTIVE PULMONARY DISEASE, UNSPECIFIED COPD TYPE: ICD-10-CM

## 2024-12-04 DIAGNOSIS — F39 MOOD DISORDER: ICD-10-CM

## 2024-12-04 DIAGNOSIS — Z72.0 TOBACCO USE: ICD-10-CM

## 2024-12-04 DIAGNOSIS — Z00.00 ANNUAL WELLNESS VISIT: Primary | ICD-10-CM

## 2024-12-04 DIAGNOSIS — Z87.891 PERSONAL HISTORY OF NICOTINE DEPENDENCE: ICD-10-CM

## 2024-12-04 DIAGNOSIS — I10 ESSENTIAL HYPERTENSION: ICD-10-CM

## 2024-12-04 DIAGNOSIS — F51.01 PRIMARY INSOMNIA: ICD-10-CM

## 2024-12-04 PROCEDURE — 3074F SYST BP LT 130 MM HG: CPT | Performed by: INTERNAL MEDICINE

## 2024-12-04 PROCEDURE — 3078F DIAST BP <80 MM HG: CPT | Performed by: INTERNAL MEDICINE

## 2024-12-04 PROCEDURE — G0439 PPPS, SUBSEQ VISIT: HCPCS | Performed by: INTERNAL MEDICINE

## 2024-12-04 PROCEDURE — 99213 OFFICE O/P EST LOW 20 MIN: CPT | Performed by: INTERNAL MEDICINE

## 2024-12-04 PROCEDURE — 1160F RVW MEDS BY RX/DR IN RCRD: CPT | Performed by: INTERNAL MEDICINE

## 2024-12-04 PROCEDURE — 1159F MED LIST DOCD IN RCRD: CPT | Performed by: INTERNAL MEDICINE

## 2024-12-04 PROCEDURE — 1126F AMNT PAIN NOTED NONE PRSNT: CPT | Performed by: INTERNAL MEDICINE

## 2024-12-04 RX ORDER — MIRTAZAPINE 7.5 MG/1
7.5 TABLET, FILM COATED ORAL NIGHTLY
Qty: 90 TABLET | Refills: 1 | Status: SHIPPED | OUTPATIENT
Start: 2024-12-04

## 2024-12-04 RX ORDER — METOPROLOL TARTRATE 50 MG
50 TABLET ORAL 2 TIMES DAILY
Qty: 180 TABLET | Refills: 1 | Status: SHIPPED | OUTPATIENT
Start: 2024-12-04

## 2024-12-04 RX ORDER — LISINOPRIL 20 MG/1
20 TABLET ORAL DAILY
Qty: 90 TABLET | Refills: 1 | Status: SHIPPED | OUTPATIENT
Start: 2024-12-04

## 2024-12-04 RX ORDER — TRAZODONE HYDROCHLORIDE 150 MG/1
150 TABLET ORAL NIGHTLY
Qty: 90 TABLET | Refills: 1 | Status: SHIPPED | OUTPATIENT
Start: 2024-12-04

## 2024-12-04 RX ORDER — ALBUTEROL SULFATE 90 UG/1
2 INHALANT RESPIRATORY (INHALATION)
Qty: 18 G | Refills: 1 | Status: SHIPPED | OUTPATIENT
Start: 2024-12-04

## 2024-12-04 RX ORDER — FLUTICASONE FUROATE AND VILANTEROL 100; 25 UG/1; UG/1
1 POWDER RESPIRATORY (INHALATION)
Qty: 3 EACH | Refills: 1 | Status: SHIPPED | OUTPATIENT
Start: 2024-12-04

## 2024-12-04 NOTE — PROGRESS NOTES
Subjective   The ABCs of the Annual Wellness Visit  Medicare Wellness Visit      Chika Pike is a 69 y.o. patient who presents for a Medicare Wellness Visit.    The following portions of the patient's history were reviewed and   updated as appropriate: allergies, current medications, past family history, past medical history, past social history, past surgical history, and problem list.    Compared to one year ago, the patient's physical   health is worse.  Compared to one year ago, the patient's mental   health is worse.    Recent Hospitalizations:  She was not admitted to the hospital during the last year.     Current Medical Providers:  Patient Care Team:  Carole Trevizo MD as PCP - General (Internal Medicine & Pediatrics)    Outpatient Medications Prior to Visit   Medication Sig Dispense Refill    CBD (cannabidiol) oral oil Take  by mouth.      ELDERBERRY PO Take  by mouth.      gabapentin (NEURONTIN) 400 MG capsule Take 1 capsule by mouth 3 (Three) Times a Day.      HYDROcodone-acetaminophen (NORCO) 7.5-325 MG per tablet Take 1 tablet by mouth 3 (Three) Times a Day.      meclizine (ANTIVERT) 25 MG tablet Take 1 tablet by mouth 3 (Three) Times a Day As Needed for Dizziness. 90 tablet 1    melatonin 5 MG tablet tablet Take 1 tablet by mouth.      sulfamethoxazole-trimethoprim (Bactrim DS) 800-160 MG per tablet Take 1 tablet by mouth 2 (Two) Times a Day. 14 tablet 0    albuterol sulfate  (90 Base) MCG/ACT inhaler Inhale 2 puffs 4 (Four) Times a Day. 18 g 1    amitriptyline (ELAVIL) 25 MG tablet Take 1 tablet by mouth At Night As Needed for Sleep. 90 tablet 1    Fluticasone Furoate-Vilanterol 100-25 MCG/ACT aerosol powder  Inhale 1 puff Daily. 3 each 1    linaclotide (Linzess) 72 MCG capsule capsule Take 1 capsule by mouth Every Morning Before Breakfast. 90 capsule 1    lisinopril (PRINIVIL,ZESTRIL) 20 MG tablet Take 1 tablet by mouth Daily. 90 tablet 0    metoprolol tartrate (LOPRESSOR) 50 MG tablet Take  "1 tablet by mouth 2 (Two) Times a Day. 180 tablet 1    traZODone (DESYREL) 150 MG tablet Take 1 tablet by mouth Every Night. 90 tablet 1     No facility-administered medications prior to visit.     Opioid medication/s are on active medication list.  and I have evaluated her active treatment plan and pain score trends (see table).  Vitals:    12/04/24 1448   PainSc: 0-No pain     I have reviewed the chart for potential of high risk medication and harmful drug interactions in the elderly.        Aspirin is not on active medication list.  Aspirin use is not indicated based on review of current medical condition/s. Risk of harm outweighs potential benefits.  .    Patient Active Problem List   Diagnosis    Essential hypertension    COPD (chronic obstructive pulmonary disease)    Left sciatic nerve pain    H/O spinal fusion    Chronic prescription opiate use     Advance Care Planning Advance Directive is not on file.  ACP discussion was held with the patient during this visit. Patient has an advance directive (not in EMR), copy requested.            Objective   Vitals:    12/04/24 1448   BP: 126/68   Pulse: 62   SpO2: 98%   Weight: 71.4 kg (157 lb 8 oz)   Height: 167.6 cm (66\")   PainSc: 0-No pain       Estimated body mass index is 25.42 kg/m² as calculated from the following:    Height as of this encounter: 167.6 cm (66\").    Weight as of this encounter: 71.4 kg (157 lb 8 oz).            Does the patient have evidence of cognitive impairment? No                                                                                                Health  Risk Assessment    Smoking Status:  Social History     Tobacco Use   Smoking Status Every Day    Current packs/day: 1.00    Average packs/day: 1 pack/day for 37.9 years (37.9 ttl pk-yrs)    Types: Cigarettes    Start date: 1987    Passive exposure: Current   Smokeless Tobacco Never     Alcohol Consumption:  Social History     Substance and Sexual Activity   Alcohol Use Not " Currently       Fall Risk Screen  STEADI Fall Risk Assessment was completed, and patient is at LOW risk for falls.Assessment completed on:2024    Depression Screening   Little interest or pleasure in doing things? Several days   Feeling down, depressed, or hopeless? Several days   PHQ-2 Total Score 2   Trouble falling or staying asleep, or sleeping too much? Over half   Feeling tired or having little energy? Over half   Poor appetite or overeating? Almost all   Feeling bad about yourself - or that you are a failure or have let yourself or your family down? Not at all   Trouble concentrating on things, such as reading the newspaper or watching television? Several days   Moving or speaking so slowly that other people could have noticed? Or the opposite - being so fidgety or restless that you have been moving around a lot more than usual? Over half   Thoughts that you would be better off dead, or of hurting yourself in some way? Not at all   PHQ-9 Total Score 12   If you checked off any problems, how difficult have these problems made it for you to do your work, take care of things at home, or get along with other people? Not difficult at all      Health Habits and Functional and Cognitive Screenin/4/2024     2:52 PM   Functional & Cognitive Status   Do you have difficulty preparing food and eating? No   Do you have difficulty bathing yourself, getting dressed or grooming yourself? No   Do you have difficulty using the toilet? No   Do you have difficulty moving around from place to place? No   Do you have trouble with steps or getting out of a bed or a chair? Yes   Current Diet Unhealthy Diet   Dental Exam Up to date   Eye Exam Up to date   Exercise (times per week) 3 times per week   Current Exercises Include Walking;House Cleaning   Do you need help using the phone?  No   Are you deaf or do you have serious difficulty hearing?  No   Do you need help to go to places out of walking distance? No   Do you  need help shopping? No   Do you need help preparing meals?  No   Do you need help with housework?  No   Do you need help with laundry? No   Do you need help taking your medications? No   Do you need help managing money? No   Do you ever drive or ride in a car without wearing a seat belt? No   Have you felt unusual stress, anger or loneliness in the last month? No   Who do you live with? Alone   If you need help, do you have trouble finding someone available to you? No   Have you been bothered in the last four weeks by sexual problems? No   Do you have difficulty concentrating, remembering or making decisions? No           Age-appropriate Screening Schedule:  Refer to the list below for future screening recommendations based on patient's age, sex and/or medical conditions. Orders for these recommended tests are listed in the plan section. The patient has been provided with a written plan.    Health Maintenance List  Health Maintenance   Topic Date Due    LUNG CANCER SCREENING  05/23/2024    INFLUENZA VACCINE  03/31/2025 (Originally 7/1/2024)    COVID-19 Vaccine (3 - 2024-25 season) 11/01/2025 (Originally 9/1/2024)    Pneumococcal Vaccine 65+ (1 of 2 - PCV) 12/04/2025 (Originally 3/8/1961)    TDAP/TD VACCINES (1 - Tdap) 12/04/2025 (Originally 3/8/1976)    ZOSTER VACCINE (1 of 2) 12/04/2025 (Originally 3/8/2007)    BMI FOLLOWUP  02/16/2025    DXA SCAN  06/16/2025    ANNUAL WELLNESS VISIT  12/04/2025    MAMMOGRAM  02/22/2026    COLORECTAL CANCER SCREENING  08/28/2026    HEPATITIS C SCREENING  Completed                                                                                                                                                CMS Preventative Services Quick Reference  Risk Factors Identified During Encounter  Tobacco Use/Dependance Risk (use dotphrase .tobaccocessation for documentation)    The above risks/problems have been discussed with the patient.  Pertinent information has been shared with the  patient in the After Visit Summary.  An After Visit Summary and PPPS were made available to the patient.    Follow Up:   Next Medicare Wellness visit to be scheduled in 1 year.         Additional E&M Note during same encounter follows:  Patient has additional, significant, and separately identifiable condition(s)/problem(s) that require work above and beyond the Medicare Wellness Visit     Chief Complaint  Annual Exam (Patient presents today for medicare wellness.)    Subjective    HPI  Lauar is also being seen today for additional medical problem/s.       The patient is a 69-year-old female here today for her subsequent Medicare wellness visit.    She reports a decline in her physical health compared to the previous year, attributing it to increased pain. She has not been hospitalized overnight or undergone any major surgeries this year. She does not use mobility aids such as a walker or cane on a regular basis. She has no history of myocardial infarction or cerebrovascular accident and does not take aspirin daily. She reports no cognitive issues or signs of dementia. She has a living will, which needs to be updated due to the recent passing of her . She has an upcoming ultrasound of her kidney scheduled for 12/11/2024 at [facility]. She experiences intermittent right-sided renal pain and occasional urinary retention. She declines influenza and pneumonia vaccinations today. She also reports no lower extremity edema. She continues to see her pain management doctor.    She also reports experiencing panic attacks, which have remained consistent over the past year. She has previously been prescribed Vistaril, BuSpar, and Klonopin for these episodes. She has not tried Zoloft for depression and anxiety. She has not tried Remeron for either anxiety or insomnia.    She uses trazodone and amitriptyline for sleep.    She admits to smoking approximately 1.5 packs of cigarettes daily but abstains from alcohol.    SOCIAL  "HISTORY  She does not drink alcohol. She smokes about a pack and a half per day.    MEDICATIONS  Current: trazodone, amitriptyline    IMMUNIZATIONS  She declined the influenza and pneumonia vaccines.          Objective   Vital Signs:  /68   Pulse 62   Ht 167.6 cm (66\")   Wt 71.4 kg (157 lb 8 oz)   SpO2 98%   BMI 25.42 kg/m²   Physical Exam  Vitals and nursing note reviewed.   Constitutional:       Appearance: Normal appearance.   Cardiovascular:      Rate and Rhythm: Normal rate and regular rhythm.      Heart sounds: No murmur heard.     No friction rub. No gallop.   Pulmonary:      Effort: Pulmonary effort is normal.      Breath sounds: Normal breath sounds. No wheezing, rhonchi or rales.   Neurological:      Mental Status: She is alert.           Lungs were auscultated.    Vital Signs  Blood pressure is normal.            Results                Assessment and Plan     1. Annual wellness visit      2. Tobacco use    -  CT Chest Low Dose Cancer Screening WO; Future    3. Primary insomnia  Continue  - traZODone (DESYREL) 150 MG tablet; Take 1 tablet by mouth Every Night.  Dispense: 90 tablet; Refill: 1  - amitriptyline (ELAVIL) 25 MG tablet; Take 1 tablet by mouth At Night As Needed for Sleep.  Dispense: 90 tablet; Refill: 1  START  - mirtazapine (REMERON) 7.5 MG tablet; Take 1 tablet by mouth Every Night.  Dispense: 90 tablet; Refill: 1    4. Essential hypertension  - metoprolol tartrate (LOPRESSOR) 50 MG tablet; Take 1 tablet by mouth 2 (Two) Times a Day.  Dispense: 180 tablet; Refill: 1  - lisinopril (PRINIVIL,ZESTRIL) 20 MG tablet; Take 1 tablet by mouth Daily.  Dispense: 90 tablet; Refill: 1    5. Chronic obstructive pulmonary disease, unspecified COPD type  - Fluticasone Furoate-Vilanterol 100-25 MCG/ACT aerosol powder ; Inhale 1 puff Daily.  Dispense: 3 each; Refill: 1    6. Personal history of nicotine dependence    -  CT Chest Low Dose Cancer Screening WO; Future    7. Mood disorder  START  - " mirtazapine (REMERON) 7.5 MG tablet; Take 1 tablet by mouth Every Night.  Dispense: 90 tablet; Refill: 1           1. Medicare wellness visit.  Her blood pressure readings are within the normal range. She is due for her influenza and pneumonia vaccines, which she has declined at this time. She is also due for her shingles and tetanus vaccines. She has been advised to update her living will and bring it to the clinic for scanning. She has been informed that she can receive her influenza and pneumonia vaccines at any time without a prior appointment. She has been advised to receive her shingles and tetanus vaccines at her local pharmacy or health department. A comprehensive discussion regarding the potential benefits of these vaccines was conducted. A lung CT scan will be ordered to screen for any potential abnormalities. Given that her last full panel of blood work was conducted less than a year ago, it is not due until February 2025, and it will be deferred until her next appointment.    2. Panic attacks.  She reports experiencing frequent panic attacks. She has previously tried BuSpar (buspirone) and Klonopin (clonazepam) borrowed from a neighbor for severe episodes. Due to her current pain medication regimen, Klonopin is not recommended. A prescription for Remeron (mirtazapine) at the lowest available dose will be provided, to be taken nightly at bedtime for a duration of one month. If necessary, the dosage can be adjusted. She has been instructed to inform her pain management specialist about this new medication.    3. Smoking.  She smokes about a pack and a half per day. She has been provided with resources for smoking cessation programs available in the [facility].    4. Insomnia.  She uses trazodone and amitriptyline for sleep.            Follow Up   Return in about 6 months (around 6/4/2025).  Patient was given instructions and counseling regarding her condition or for health maintenance advice. Please  see specific information pulled into the AVS if appropriate.  Patient or patient representative verbalized consent for the use of Ambient Listening during the visit with  Carole rTevizo MD for chart documentation. 12/6/2024  13:21 EST

## 2024-12-04 NOTE — PATIENT INSTRUCTIONS
Dr. Trevizo has ordered a low-dose lung cancer screening for you due to your longstanding history of smoking which puts you at high risk of lung cancer.  Lung cancer screening which is a CAT scan helps find numbers in an earlier stage.  We ordered Besson early 2023 but he did not complete it which is the reason we are reordering it again.  This recommended to do this every year for patients who are smokers.  Steps to Quit Smoking  Smoking tobacco is the leading cause of preventable death. It can affect almost every organ in the body. Smoking puts you and those around you at risk for developing many serious chronic diseases.  Quitting smoking can be very challenging. Do not get discouraged if you are not successful the first time. Some people need to make many attempts to quit before they achieve long-term success. Do your best to stick to your quit plan, and talk with your health care provider if you have any questions or concerns.  How do I get ready to quit?  When you decide to quit smoking, create a plan to help you succeed. Before you quit:  Pick a date to quit. Set a date within the next 2 weeks to give you time to prepare.  Write down the reasons why you are quitting. Keep this list in places where you will see it often.  Tell your family, friends, and co-workers that you are quitting. Support from people you are close to can make quitting easier.  Talk with your health care provider about your options for quitting smoking.  Find out what treatment options are covered by your health insurance.  Identify people, places, things, and activities that make you want to smoke (triggers). Avoid them.  What first steps can I take to quit smoking?  Throw away all cigarettes at home, at work, and in your car.  Throw away smoking accessories, such as ashtrays and lighters.  Clean your car. Make sure to empty the ashtray.  Clean your home, including curtains and carpets.  What strategies can I use to quit smoking?  Talk  with your health care provider about combining strategies, such as taking medicines while you are also receiving in-person counseling. Using these two strategies together makes you more likely to succeed in quitting than if you used either strategy on its own.  If you are pregnant or breastfeeding, talk with your health care provider about finding counseling or other support strategies to quit smoking. Do not take medicine to help you quit smoking unless your health care provider tells you to.  Quit right away  Quit smoking completely, instead of gradually reducing how much you smoke over a period of time. Stopping smoking right away may be more successful than gradually quitting.  Attend in-person counseling to help you build problem-solving skills. You are more likely to succeed in quitting if you attend counseling sessions regularly. Even short sessions of 10 minutes can be effective.  Take medicine  You may take medicines to help you quit smoking. Some medicines require a prescription. You can also purchase over-the-counter medicines. Medicines may have nicotine in them to replace the nicotine in cigarettes. Medicines may:  Help to stop cravings.  Help to relieve withdrawal symptoms.  Your health care provider may recommend:  Nicotine patches, gum, or lozenges.  Nicotine inhalers or sprays.  Non-nicotine medicine that you take by mouth.  Find resources  Find resources and support systems that can help you quit smoking and remain smoke-free after you quit. These resources are most helpful when you use them often. They include:  Online chats with a counselor.  Telephone quitlines.  Printed self-help materials.  Support groups or group counseling.  Text messaging programs.  Mobile phone apps or applications. Use apps that can help you stick to your quit plan by providing reminders, tips, and encouragement. Examples of free services include Quit Guide from the CDC and smokefree.gov    What can I do to make it easier  to quit?    Reach out to your family and friends for support and encouragement. Call telephone quitlines, such as 8-800-QUIT-NOW, reach out to support groups, or work with a counselor for support.  Ask people who smoke to avoid smoking around you.  Avoid places that trigger you to smoke, such as bars, parties, or smoke-break areas at work.  Spend time with people who do not smoke.  Lessen the stress in your life. Stress can be a smoking trigger for some people. To lessen stress, try:  Exercising regularly.  Doing deep-breathing exercises.  Doing yoga.  Meditating.  What benefits will I see if I quit smoking?  Over time, you should start to see positive results, such as:  Improved sense of smell and taste.  Decreased coughing and sore throat.  Slower heart rate.  Lower blood pressure.  Clearer and healthier skin.  The ability to breathe more easily.  Fewer sick days.  Summary  Quitting smoking can be very challenging. Do not get discouraged if you are not successful the first time. Some people need to make many attempts to quit before they achieve long-term success.  When you decide to quit smoking, create a plan to help you succeed.  Quit smoking right away, not slowly over a period of time.  Find resources and support systems that can help you quit smoking and remain smoke-free after you quit.  This information is not intended to replace advice given to you by your health care provider. Make sure you discuss any questions you have with your health care provider.  Document Revised: 12/09/2022 Document Reviewed: 12/09/2022  [employer] Patient Education © 2024 [employer]

## 2024-12-04 NOTE — LETTER
December 4, 2024    [facility]ika Rodriguez  [address] [facility] [address]    Unless you have had a previous reaction, I recommend and encourage that you request to receive the following vaccines at your pharmacy:      Shingrix (Shingles vaccine)  Tdap (Tetanus  and whooping cough booster)  COVID-19 vaccines (you may need the newest bi-valent booster)     We can do the following in the office any time:  PCV20 (Pneumonia Vaccine)  Influenza (Flu  Vaccine) starting in September or October.    They can administer this at your convenience without a prescription.    If you have already received these from your pharmacy or another physician, or if you get them in the future please be sure to provide us a copy of your immunization record so that we can update your records.    You can have your pharmacy fax them to the number above or find the exact date of your vaccines and bring them to your next appointment.                             Carole Trevizo MD

## 2024-12-12 DIAGNOSIS — Z87.440 HISTORY OF UTI: ICD-10-CM

## 2024-12-12 DIAGNOSIS — R30.0 DYSURIA: ICD-10-CM

## 2024-12-12 DIAGNOSIS — N28.1 RENAL CYST, LEFT: ICD-10-CM

## 2024-12-12 DIAGNOSIS — R19.8 UMBILICUS DISCHARGE: ICD-10-CM

## 2024-12-12 DIAGNOSIS — L08.82 OMPHALITIS IN ADULT: ICD-10-CM

## 2024-12-13 ENCOUNTER — TELEPHONE (OUTPATIENT)
Dept: FAMILY MEDICINE CLINIC | Facility: CLINIC | Age: 69
End: 2024-12-13

## 2024-12-13 DIAGNOSIS — K76.0 FATTY LIVER: ICD-10-CM

## 2024-12-13 DIAGNOSIS — K83.8 COMMON BILE DUCT DILATATION: Primary | ICD-10-CM

## 2024-12-13 NOTE — TELEPHONE ENCOUNTER
"Caller: Chika Pike \"Laura\"    Relationship: Self    Best call back number: [phone]     What test was performed: 12/9/24    When was the test performed: ULTRASOUND    [facility] was the test performed: [facility]    Additional notes: PLEASE CALL PATIENT WITH RESULTS OF THE ULTRASOUND  "

## 2024-12-13 NOTE — TELEPHONE ENCOUNTER
Her ultrasound did not show any obvious gallstones but it did show fatty liver and some enlargement of the liver and some of the ducts or tubes inside of the liver. Sometimes those ducts can get enlarged with a blockage inside the liver so  I would like to do what is called an MRCP to get a better look. This is a special type of MRI that uses contrast to get a close look at the liver and all of the ducts to make sure thee is not a stone or blockage we are overlooking.

## 2024-12-16 ENCOUNTER — TELEPHONE (OUTPATIENT)
Dept: FAMILY MEDICINE CLINIC | Facility: CLINIC | Age: 69
End: 2024-12-16

## 2024-12-16 NOTE — TELEPHONE ENCOUNTER
"  Caller: Chika Pike \"Laura\"    Relationship: Self    Best call back number: [phone]    What is the best time to reach you: ANYTIME     Who are you requesting to speak with (clinical staff, provider,  specific staff member): CLINICAL STAFF     PATIENT IS WANTING TO SPEAK WITH STAFF ABOUT MEDICATION THAT WAS PRESCRIBED. SHE STATES NEVER TOOK MIRTAZAPINE BEFORE AND WANTED TO KNOW IF SHE NEEDED TO TAKE IT. PLEASE CALL TO DISCUSS.   "

## 2024-12-17 NOTE — TELEPHONE ENCOUNTER
It was prescribed at her last appointment because she told me her anxiety medication wasn't working and she was not sleeping well.  I prescribed it because of what she told me. If she does not feel  that those symptoms are bothersome then she does not need to take it.

## 2024-12-31 ENCOUNTER — TELEPHONE (OUTPATIENT)
Dept: FAMILY MEDICINE CLINIC | Facility: CLINIC | Age: 69
End: 2024-12-31
Payer: MEDICARE

## 2024-12-31 NOTE — TELEPHONE ENCOUNTER
Susie from [facility] called. She said she has two orders for patient. MRI Abd w/contrast and CT chest low dose but does not have PA's for the orders

## 2025-01-10 ENCOUNTER — TELEPHONE (OUTPATIENT)
Dept: FAMILY MEDICINE CLINIC | Facility: CLINIC | Age: 70
End: 2025-01-10

## 2025-01-10 NOTE — TELEPHONE ENCOUNTER
Caller: YENI    Relationship: Other- [facility]    Best call back number:    [phone]       Who are you requesting to speak with (clinical staff, provider,  specific staff member): CLINICAL    What was the call regarding: YENI FROM [facility] STATES THAT SHE NEEDS TO KNOW IF THE LOW DOSE CT CHEST SCREENING FOR THE PATIENT NEEDS A PRE CERTIFICATION. ADDITIONALLY, YENI STATES THAT SHE NEEDS A PRE CERTIFICATION REFERENCE NUMBER FOR THE MRI  ABDOMEN MRCP ORDER FOR THE PATIENT IN ORDER TO SCHEDULE IT.

## 2025-01-21 ENCOUNTER — TELEPHONE (OUTPATIENT)
Dept: FAMILY MEDICINE CLINIC | Facility: CLINIC | Age: 70
End: 2025-01-21

## 2025-01-21 DIAGNOSIS — K83.8 COMMON BILE DUCT DILATATION: Primary | ICD-10-CM

## 2025-01-21 NOTE — TELEPHONE ENCOUNTER
"    Caller: Chika Pike \"Laura\"    Relationship: Self    Best call back number: [phone]    What is the medical concern/diagnosis: LIVER SEEMED TO BE ENLARGED    What specialty or service is being requested:  CT SCAN FOR LUNGS OR LIVER    What is the provider, practice or medical service name: [facility] IN [place]  "

## 2025-01-22 NOTE — TELEPHONE ENCOUNTER
I ordered am abdomen MRI for her liver and a Chest CT for her lungs in December. Orders should still be valid. Can we see if we can send orders to facility she requested?

## 2025-02-14 ENCOUNTER — TELEPHONE (OUTPATIENT)
Dept: FAMILY MEDICINE CLINIC | Facility: CLINIC | Age: 70
End: 2025-02-14

## 2025-02-14 NOTE — TELEPHONE ENCOUNTER
"Relay     \"Left msg for patient.     She is scheduled March 10 at 2pm for the MRI at [facility] [place] [address]  Tel: [phone] \"                "

## 2025-02-27 ENCOUNTER — TELEPHONE (OUTPATIENT)
Dept: FAMILY MEDICINE CLINIC | Facility: CLINIC | Age: 70
End: 2025-02-27
Payer: MEDICARE

## 2025-02-27 DIAGNOSIS — Z98.1 H/O SPINAL FUSION: Primary | ICD-10-CM

## 2025-02-27 DIAGNOSIS — M51.26: ICD-10-CM

## 2025-02-27 DIAGNOSIS — S22.080S COMPRESSION FRACTURE OF T12 VERTEBRA, SEQUELA: ICD-10-CM

## 2025-02-27 DIAGNOSIS — M48.061 SPINAL STENOSIS OF LUMBAR REGION, UNSPECIFIED WHETHER NEUROGENIC CLAUDICATION PRESENT: ICD-10-CM

## 2025-02-27 NOTE — TELEPHONE ENCOUNTER
"Caller: Chika Pike \"Laura\"    Relationship: Self    Best call back number: [phone]     What is the medical concern/diagnosis: LOWER BACK PAIN    What specialty or service is being requested: PAIN MANAGEMENT    Any additional details: PATIENT CALLED TO REQUEST A REFERRAL TO PAIN MANAGEMENT SO THAT SHE COULD BE SEEN AT A PAIN CLINIC FOR HER LOWER BACK PAIN    PLEASE ADVISE    "

## 2025-03-17 ENCOUNTER — TELEPHONE (OUTPATIENT)
Dept: FAMILY MEDICINE CLINIC | Facility: CLINIC | Age: 70
End: 2025-03-17

## 2025-03-17 NOTE — TELEPHONE ENCOUNTER
"    Caller: Chika Pike \"Laura\"     Relationship: [unfilled]     Best call back number: [phone]    What is your medical concern? PT STATED THAT SHE DID NOT HAVE ULTRASOUND ON  KIDNEY/LIVER LAST WEEK BECAUSE IT WAS TO CLOSED IN FOR HER, ALSO   PAIN MANAGEMENT WANTS TO IMPLANT WIRE UP SPINE, PT NOT WILLING TO DO SO PT WILL LIKE TO BE SEEN AT PAIN PAIN MANAGEMENT IN [facility] PT DOES NOT HAVE ANY INFORMATION ABOUT THE FACILITY BUT STATED THAT SHE HAS A FRIEND THAT WAS SEEN THERE AN THEY LIKED IT  "

## 2025-03-18 NOTE — TELEPHONE ENCOUNTER
Please obtain office notes from the existing pain management (Dr Arango) office for me to review. Let me know once they are in the chart and then I will review them and consider whether new pain management referral is appropriate

## 2025-03-21 ENCOUNTER — TELEPHONE (OUTPATIENT)
Dept: FAMILY MEDICINE CLINIC | Facility: CLINIC | Age: 70
End: 2025-03-21

## 2025-03-21 NOTE — TELEPHONE ENCOUNTER
Spoke with patient and let know that she can call to schedule her mammogram. She does not need a referral for a routine mammogram.Pt will call to schedule her own appt.

## 2025-03-21 NOTE — TELEPHONE ENCOUNTER
"    Caller: Chika Pike \"Laura\"    Relationship: Self    Best call back number: [phone]    What is the medical concern/diagnosis: ROUTINE    What specialty or service is being requested: MAMMOGRAM    What is the provider, practice or medical service name: [facility]        "